# Patient Record
Sex: MALE | Race: OTHER | HISPANIC OR LATINO | URBAN - METROPOLITAN AREA
[De-identification: names, ages, dates, MRNs, and addresses within clinical notes are randomized per-mention and may not be internally consistent; named-entity substitution may affect disease eponyms.]

---

## 2024-09-24 ENCOUNTER — INPATIENT (INPATIENT)
Facility: HOSPITAL | Age: 49
LOS: 6 days | Discharge: HOME | DRG: 379 | End: 2024-10-01
Attending: INTERNAL MEDICINE | Admitting: INTERNAL MEDICINE
Payer: SELF-PAY

## 2024-09-24 VITALS
SYSTOLIC BLOOD PRESSURE: 85 MMHG | HEART RATE: 83 BPM | DIASTOLIC BLOOD PRESSURE: 53 MMHG | OXYGEN SATURATION: 99 % | RESPIRATION RATE: 20 BRPM | TEMPERATURE: 98 F

## 2024-09-24 LAB
ABO RH CONFIRMATION: SIGNIFICANT CHANGE UP
ALBUMIN SERPL ELPH-MCNC: 3.1 G/DL — LOW (ref 3.5–5)
ALP SERPL-CCNC: 112 U/L — SIGNIFICANT CHANGE UP (ref 40–120)
ALT FLD-CCNC: 44 U/L DA — SIGNIFICANT CHANGE UP (ref 10–60)
ANION GAP SERPL CALC-SCNC: 10 MMOL/L — SIGNIFICANT CHANGE UP (ref 5–17)
ANISOCYTOSIS BLD QL: SLIGHT — SIGNIFICANT CHANGE UP
APTT BLD: 25.8 SEC — SIGNIFICANT CHANGE UP (ref 24.5–35.6)
AST SERPL-CCNC: 25 U/L — SIGNIFICANT CHANGE UP (ref 10–40)
BASO STIPL BLD QL SMEAR: PRESENT — SIGNIFICANT CHANGE UP
BASOPHILS # BLD AUTO: 0.08 K/UL — SIGNIFICANT CHANGE UP (ref 0–0.2)
BASOPHILS NFR BLD AUTO: 0.7 % — SIGNIFICANT CHANGE UP (ref 0–2)
BILIRUB SERPL-MCNC: 0.4 MG/DL — SIGNIFICANT CHANGE UP (ref 0.2–1.2)
BLD GP AB SCN SERPL QL: SIGNIFICANT CHANGE UP
BUN SERPL-MCNC: 21 MG/DL — HIGH (ref 7–18)
CALCIUM SERPL-MCNC: 7.9 MG/DL — LOW (ref 8.4–10.5)
CHLORIDE SERPL-SCNC: 115 MMOL/L — HIGH (ref 96–108)
CO2 SERPL-SCNC: 21 MMOL/L — LOW (ref 22–31)
CREAT SERPL-MCNC: 1.22 MG/DL — SIGNIFICANT CHANGE UP (ref 0.5–1.3)
EGFR: 74 ML/MIN/1.73M2 — SIGNIFICANT CHANGE UP
EOSINOPHIL # BLD AUTO: 0.56 K/UL — HIGH (ref 0–0.5)
EOSINOPHIL NFR BLD AUTO: 4.8 % — SIGNIFICANT CHANGE UP (ref 0–6)
GLUCOSE SERPL-MCNC: 228 MG/DL — HIGH (ref 70–99)
HCT VFR BLD CALC: 21.2 % — LOW (ref 39–50)
HGB BLD-MCNC: 6.5 G/DL — CRITICAL LOW (ref 13–17)
HYPOCHROMIA BLD QL: SLIGHT — SIGNIFICANT CHANGE UP
IMM GRANULOCYTES NFR BLD AUTO: 0.4 % — SIGNIFICANT CHANGE UP (ref 0–0.9)
INR BLD: 1.3 RATIO — HIGH (ref 0.85–1.16)
LACTATE SERPL-SCNC: 2.9 MMOL/L — HIGH (ref 0.7–2)
LIDOCAIN IGE QN: 45 U/L — SIGNIFICANT CHANGE UP (ref 13–75)
LYMPHOCYTES # BLD AUTO: 3.56 K/UL — HIGH (ref 1–3.3)
LYMPHOCYTES # BLD AUTO: 30.6 % — SIGNIFICANT CHANGE UP (ref 13–44)
MAGNESIUM SERPL-MCNC: 1.7 MG/DL — SIGNIFICANT CHANGE UP (ref 1.6–2.6)
MANUAL SMEAR VERIFICATION: SIGNIFICANT CHANGE UP
MCHC RBC-ENTMCNC: 23.6 PG — LOW (ref 27–34)
MCHC RBC-ENTMCNC: 30.7 GM/DL — LOW (ref 32–36)
MCV RBC AUTO: 77.1 FL — LOW (ref 80–100)
MICROCYTES BLD QL: SLIGHT — SIGNIFICANT CHANGE UP
MONOCYTES # BLD AUTO: 0.83 K/UL — SIGNIFICANT CHANGE UP (ref 0–0.9)
MONOCYTES NFR BLD AUTO: 7.1 % — SIGNIFICANT CHANGE UP (ref 2–14)
NEUTROPHILS # BLD AUTO: 6.54 K/UL — SIGNIFICANT CHANGE UP (ref 1.8–7.4)
NEUTROPHILS NFR BLD AUTO: 56.4 % — SIGNIFICANT CHANGE UP (ref 43–77)
NRBC # BLD: 0 /100 WBCS — SIGNIFICANT CHANGE UP (ref 0–0)
OVALOCYTES BLD QL SMEAR: SLIGHT — SIGNIFICANT CHANGE UP
PLAT MORPH BLD: NORMAL — SIGNIFICANT CHANGE UP
PLATELET # BLD AUTO: 132 K/UL — LOW (ref 150–400)
PLATELET COUNT - ESTIMATE: ABNORMAL
POIKILOCYTOSIS BLD QL AUTO: SLIGHT — SIGNIFICANT CHANGE UP
POTASSIUM SERPL-MCNC: 3.4 MMOL/L — LOW (ref 3.5–5.3)
POTASSIUM SERPL-SCNC: 3.4 MMOL/L — LOW (ref 3.5–5.3)
PROT SERPL-MCNC: 5.9 G/DL — LOW (ref 6–8.3)
PROTHROM AB SERPL-ACNC: 15.2 SEC — HIGH (ref 9.9–13.4)
RBC # BLD: 2.75 M/UL — LOW (ref 4.2–5.8)
RBC # FLD: 17.1 % — HIGH (ref 10.3–14.5)
RBC BLD AUTO: ABNORMAL
SODIUM SERPL-SCNC: 146 MMOL/L — HIGH (ref 135–145)
TROPONIN I, HIGH SENSITIVITY RESULT: 16.5 NG/L — SIGNIFICANT CHANGE UP
WBC # BLD: 11.62 K/UL — HIGH (ref 3.8–10.5)
WBC # FLD AUTO: 11.62 K/UL — HIGH (ref 3.8–10.5)

## 2024-09-24 PROCEDURE — 99285 EMERGENCY DEPT VISIT HI MDM: CPT

## 2024-09-24 PROCEDURE — 99053 MED SERV 10PM-8AM 24 HR FAC: CPT

## 2024-09-24 PROCEDURE — 71275 CT ANGIOGRAPHY CHEST: CPT | Mod: 26,MC

## 2024-09-24 PROCEDURE — 74174 CTA ABD&PLVS W/CONTRAST: CPT | Mod: 26,MC

## 2024-09-24 RX ORDER — SODIUM CHLORIDE 0.9 % (FLUSH) 0.9 %
1000 SYRINGE (ML) INJECTION ONCE
Refills: 0 | Status: COMPLETED | OUTPATIENT
Start: 2024-09-24 | End: 2024-09-24

## 2024-09-24 RX ORDER — ONDANSETRON HCL/PF 4 MG/2 ML
4 VIAL (ML) INJECTION ONCE
Refills: 0 | Status: COMPLETED | OUTPATIENT
Start: 2024-09-24 | End: 2024-09-24

## 2024-09-24 RX ORDER — PANTOPRAZOLE SODIUM 40 MG/1
80 TABLET, DELAYED RELEASE ORAL ONCE
Refills: 0 | Status: COMPLETED | OUTPATIENT
Start: 2024-09-24 | End: 2024-09-24

## 2024-09-24 RX ADMIN — Medication 4 MILLIGRAM(S): at 22:10

## 2024-09-24 RX ADMIN — Medication 1000 MILLILITER(S): at 22:05

## 2024-09-24 RX ADMIN — PANTOPRAZOLE SODIUM 80 MILLIGRAM(S): 40 TABLET, DELAYED RELEASE ORAL at 22:23

## 2024-09-24 RX ADMIN — Medication 1000 MILLILITER(S): at 22:20

## 2024-09-24 NOTE — ED PROVIDER NOTE - PROGRESS NOTE DETAILS
patient updated on results. reports symptoms improving. BP improving, but still mildly hypotensive. received approximately 1L NS and 2 units prbc. will order 3rd  unit. Rodríguez Molina spoke with hospitalist team. will admit to tele for close monitoring. Rodríguez Molina

## 2024-09-24 NOTE — ED PROVIDER NOTE - OBJECTIVE STATEMENT
48-year-old male presenting with vomiting blood.  Patient reports he has been having some knee pains earlier today he took an ibuprofen and later also took another pain medicine.  Reports around 9 PM he started vomiting blood.  Denies any chest pain or coughing.  States he has never vomited blood before.  Denies drinking alcohol every day and states he has been sober for the past 7 years.  Has never needed a blood transfusion.  No recent travel.

## 2024-09-24 NOTE — ED PROVIDER NOTE - CLINICAL SUMMARY MEDICAL DECISION MAKING FREE TEXT BOX
48-year-old male presenting with vomiting blood.  Patient called purple to waiting room for active hematemesis.  Patient visualized with large amount of blood in plastic bag.  Patient denies any anticoagulation, previous history of anemia or any previous history of hematemesis.  Concern for GI bleed.  Will obtain labs and patient to be expedited to CAT scan. 48-year-old male presenting with vomiting blood.  Patient called purple to waiting room for active hematemesis.  Patient visualized with large amount of blood in plastic bag.  Patient denies any anticoagulation, previous history of anemia or any previous history of hematemesis.  Concern for GI bleed.  Will obtain labs and patient to be expedited to CAT scan.    hgb 6.5. called for 2 units uncrossed matched blood. PCA sent to . 48-year-old male presenting with vomiting blood.  Patient called purple to waiting room for active hematemesis.  Patient visualized with large amount of blood in plastic bag.  Patient denies any anticoagulation, previous history of anemia or any previous history of hematemesis.  Concern for GI bleed.  Will obtain labs and patient to be expedited to CAT scan.     hgb 6.5. called for 2 units uncrossed matched blood. PCA sent to .    2231: patient verbally consented for blood transfusion using  service. blood at bedside.

## 2024-09-24 NOTE — ED ADULT NURSE NOTE - OBJECTIVE STATEMENT
Pt present to the Ed with c/o epigastric pain with ashanti blood vomitus Pt present to the Ed with c/o epigastric pain with ashanti blood vomitus. Pt  is hypotensive , placed the pt on monitor.

## 2024-09-24 NOTE — ED ADULT NURSE NOTE - CHIEF COMPLAINT QUOTE
pt c/o epigastric pain with ashanti blood vomitus in WR, code purple called in WR, pt evaluated by Jesse CHOWDHURY

## 2024-09-24 NOTE — ED ADULT NURSE REASSESSMENT NOTE - NS ED NURSE REASSESS COMMENT FT1
Pt received 2 units of un crossmatched O -ve blood  fro hypotension through rapid infuser as per MD Molina order . Ist transfusion started@ 2240 and ended @ 2245 , 2nd transfusion started at 2247 and ended @2250 without any transfusion reaction .

## 2024-09-24 NOTE — ED ADULT TRIAGE NOTE - CHIEF COMPLAINT QUOTE
pt c/o epigastric pain with ashanti blood vomitus pt c/o epigastric pain with ashanti blood vomitus in WR, code purple called in WR, pt evaluated by Jesse CHOWDHURY

## 2024-09-24 NOTE — ED PROVIDER NOTE - PHYSICAL EXAMINATION
General: moderate acute distress   HEENT: normocephalic, atraumatic   Respiratory: normal work of breathing  Cardiac: regular rate and rhythm   Skin: warm, dry   Neuro: A&Ox3  Psych: appropriate affect General: moderate acute distress   HEENT: normocephalic, atraumatic   Respiratory: normal work of breathing  Cardiac: regular rate and rhythm   Skin: diaphoretic    Neuro: A&Ox3  Psych: appropriate affect

## 2024-09-25 DIAGNOSIS — Z29.9 ENCOUNTER FOR PROPHYLACTIC MEASURES, UNSPECIFIED: ICD-10-CM

## 2024-09-25 DIAGNOSIS — M25.561 PAIN IN RIGHT KNEE: ICD-10-CM

## 2024-09-25 DIAGNOSIS — K92.0 HEMATEMESIS: ICD-10-CM

## 2024-09-25 DIAGNOSIS — K74.60 UNSPECIFIED CIRRHOSIS OF LIVER: ICD-10-CM

## 2024-09-25 DIAGNOSIS — K92.2 GASTROINTESTINAL HEMORRHAGE, UNSPECIFIED: ICD-10-CM

## 2024-09-25 LAB
ALBUMIN SERPL ELPH-MCNC: 2.7 G/DL — LOW (ref 3.5–5)
ALBUMIN SERPL ELPH-MCNC: 2.9 G/DL — LOW (ref 3.5–5)
ALBUMIN SERPL ELPH-MCNC: 3.1 G/DL — LOW (ref 3.5–5)
ALP SERPL-CCNC: 77 U/L — SIGNIFICANT CHANGE UP (ref 40–120)
ALP SERPL-CCNC: 81 U/L — SIGNIFICANT CHANGE UP (ref 40–120)
ALP SERPL-CCNC: 89 U/L — SIGNIFICANT CHANGE UP (ref 40–120)
ALT FLD-CCNC: 37 U/L DA — SIGNIFICANT CHANGE UP (ref 10–60)
ALT FLD-CCNC: 38 U/L DA — SIGNIFICANT CHANGE UP (ref 10–60)
ALT FLD-CCNC: 39 U/L DA — SIGNIFICANT CHANGE UP (ref 10–60)
ANION GAP SERPL CALC-SCNC: 6 MMOL/L — SIGNIFICANT CHANGE UP (ref 5–17)
ANION GAP SERPL CALC-SCNC: 7 MMOL/L — SIGNIFICANT CHANGE UP (ref 5–17)
ANION GAP SERPL CALC-SCNC: 8 MMOL/L — SIGNIFICANT CHANGE UP (ref 5–17)
ANISOCYTOSIS BLD QL: SLIGHT — SIGNIFICANT CHANGE UP
APTT BLD: 26.6 SEC — SIGNIFICANT CHANGE UP (ref 24.5–35.6)
AST SERPL-CCNC: 19 U/L — SIGNIFICANT CHANGE UP (ref 10–40)
AST SERPL-CCNC: 20 U/L — SIGNIFICANT CHANGE UP (ref 10–40)
AST SERPL-CCNC: 22 U/L — SIGNIFICANT CHANGE UP (ref 10–40)
BASOPHILS # BLD AUTO: 0.01 K/UL — SIGNIFICANT CHANGE UP (ref 0–0.2)
BASOPHILS NFR BLD AUTO: 0.2 % — SIGNIFICANT CHANGE UP (ref 0–2)
BILIRUB SERPL-MCNC: 0.5 MG/DL — SIGNIFICANT CHANGE UP (ref 0.2–1.2)
BILIRUB SERPL-MCNC: 0.6 MG/DL — SIGNIFICANT CHANGE UP (ref 0.2–1.2)
BILIRUB SERPL-MCNC: 0.6 MG/DL — SIGNIFICANT CHANGE UP (ref 0.2–1.2)
BUN SERPL-MCNC: 21 MG/DL — HIGH (ref 7–18)
BUN SERPL-MCNC: 23 MG/DL — HIGH (ref 7–18)
BUN SERPL-MCNC: 23 MG/DL — HIGH (ref 7–18)
CALCIUM SERPL-MCNC: 6.9 MG/DL — LOW (ref 8.4–10.5)
CALCIUM SERPL-MCNC: 7.8 MG/DL — LOW (ref 8.4–10.5)
CALCIUM SERPL-MCNC: 7.8 MG/DL — LOW (ref 8.4–10.5)
CHLORIDE SERPL-SCNC: 116 MMOL/L — HIGH (ref 96–108)
CHLORIDE SERPL-SCNC: 117 MMOL/L — HIGH (ref 96–108)
CHLORIDE SERPL-SCNC: 118 MMOL/L — HIGH (ref 96–108)
CO2 SERPL-SCNC: 22 MMOL/L — SIGNIFICANT CHANGE UP (ref 22–31)
CO2 SERPL-SCNC: 22 MMOL/L — SIGNIFICANT CHANGE UP (ref 22–31)
CO2 SERPL-SCNC: 23 MMOL/L — SIGNIFICANT CHANGE UP (ref 22–31)
CREAT SERPL-MCNC: 0.79 MG/DL — SIGNIFICANT CHANGE UP (ref 0.5–1.3)
CREAT SERPL-MCNC: 0.8 MG/DL — SIGNIFICANT CHANGE UP (ref 0.5–1.3)
CREAT SERPL-MCNC: 0.92 MG/DL — SIGNIFICANT CHANGE UP (ref 0.5–1.3)
EGFR: 103 ML/MIN/1.73M2 — SIGNIFICANT CHANGE UP
EGFR: 110 ML/MIN/1.73M2 — SIGNIFICANT CHANGE UP
EGFR: 110 ML/MIN/1.73M2 — SIGNIFICANT CHANGE UP
EOSINOPHIL # BLD AUTO: 0.05 K/UL — SIGNIFICANT CHANGE UP (ref 0–0.5)
EOSINOPHIL NFR BLD AUTO: 1.1 % — SIGNIFICANT CHANGE UP (ref 0–6)
GAS PNL BLDA: SIGNIFICANT CHANGE UP
GLUCOSE SERPL-MCNC: 121 MG/DL — HIGH (ref 70–99)
GLUCOSE SERPL-MCNC: 133 MG/DL — HIGH (ref 70–99)
GLUCOSE SERPL-MCNC: 144 MG/DL — HIGH (ref 70–99)
HCT VFR BLD CALC: 19.7 % — CRITICAL LOW (ref 39–50)
HCT VFR BLD CALC: 21.3 % — LOW (ref 39–50)
HCT VFR BLD CALC: 22 % — LOW (ref 39–50)
HCT VFR BLD CALC: 22.2 % — LOW (ref 39–50)
HGB BLD-MCNC: 6.7 G/DL — CRITICAL LOW (ref 13–17)
HGB BLD-MCNC: 6.7 G/DL — CRITICAL LOW (ref 13–17)
HGB BLD-MCNC: 7.2 G/DL — LOW (ref 13–17)
HGB BLD-MCNC: 7.4 G/DL — LOW (ref 13–17)
HYPOCHROMIA BLD QL: SLIGHT — SIGNIFICANT CHANGE UP
IMM GRANULOCYTES NFR BLD AUTO: 0.4 % — SIGNIFICANT CHANGE UP (ref 0–0.9)
INR BLD: 1.2 RATIO — HIGH (ref 0.85–1.16)
LACTATE SERPL-SCNC: 1.5 MMOL/L — SIGNIFICANT CHANGE UP (ref 0.7–2)
LYMPHOCYTES # BLD AUTO: 0.37 K/UL — LOW (ref 1–3.3)
LYMPHOCYTES # BLD AUTO: 8 % — LOW (ref 13–44)
MAGNESIUM SERPL-MCNC: 1.8 MG/DL — SIGNIFICANT CHANGE UP (ref 1.6–2.6)
MANUAL SMEAR VERIFICATION: SIGNIFICANT CHANGE UP
MCHC RBC-ENTMCNC: 25.7 PG — LOW (ref 27–34)
MCHC RBC-ENTMCNC: 25.8 PG — LOW (ref 27–34)
MCHC RBC-ENTMCNC: 26.2 PG — LOW (ref 27–34)
MCHC RBC-ENTMCNC: 26.7 PG — LOW (ref 27–34)
MCHC RBC-ENTMCNC: 31.5 GM/DL — LOW (ref 32–36)
MCHC RBC-ENTMCNC: 32.7 GM/DL — SIGNIFICANT CHANGE UP (ref 32–36)
MCHC RBC-ENTMCNC: 33.3 GM/DL — SIGNIFICANT CHANGE UP (ref 32–36)
MCHC RBC-ENTMCNC: 34 GM/DL — SIGNIFICANT CHANGE UP (ref 32–36)
MCV RBC AUTO: 78.5 FL — LOW (ref 80–100)
MCV RBC AUTO: 78.7 FL — LOW (ref 80–100)
MCV RBC AUTO: 78.9 FL — LOW (ref 80–100)
MCV RBC AUTO: 81.6 FL — SIGNIFICANT CHANGE UP (ref 80–100)
MICROCYTES BLD QL: SLIGHT — SIGNIFICANT CHANGE UP
MONOCYTES # BLD AUTO: 0.31 K/UL — SIGNIFICANT CHANGE UP (ref 0–0.9)
MONOCYTES NFR BLD AUTO: 6.7 % — SIGNIFICANT CHANGE UP (ref 2–14)
NEUTROPHILS # BLD AUTO: 3.87 K/UL — SIGNIFICANT CHANGE UP (ref 1.8–7.4)
NEUTROPHILS NFR BLD AUTO: 83.6 % — HIGH (ref 43–77)
NRBC # BLD: 0 /100 WBCS — SIGNIFICANT CHANGE UP (ref 0–0)
OVALOCYTES BLD QL SMEAR: SLIGHT — SIGNIFICANT CHANGE UP
PHOSPHATE SERPL-MCNC: 3.4 MG/DL — SIGNIFICANT CHANGE UP (ref 2.5–4.5)
PLAT MORPH BLD: NORMAL — SIGNIFICANT CHANGE UP
PLATELET # BLD AUTO: 46 K/UL — LOW (ref 150–400)
PLATELET # BLD AUTO: 49 K/UL — LOW (ref 150–400)
PLATELET # BLD AUTO: 54 K/UL — LOW (ref 150–400)
PLATELET # BLD AUTO: 56 K/UL — LOW (ref 150–400)
PLATELET COUNT - ESTIMATE: ABNORMAL
POTASSIUM SERPL-MCNC: 3.8 MMOL/L — SIGNIFICANT CHANGE UP (ref 3.5–5.3)
POTASSIUM SERPL-MCNC: 3.9 MMOL/L — SIGNIFICANT CHANGE UP (ref 3.5–5.3)
POTASSIUM SERPL-MCNC: 4.7 MMOL/L — SIGNIFICANT CHANGE UP (ref 3.5–5.3)
POTASSIUM SERPL-SCNC: 3.8 MMOL/L — SIGNIFICANT CHANGE UP (ref 3.5–5.3)
POTASSIUM SERPL-SCNC: 3.9 MMOL/L — SIGNIFICANT CHANGE UP (ref 3.5–5.3)
POTASSIUM SERPL-SCNC: 4.7 MMOL/L — SIGNIFICANT CHANGE UP (ref 3.5–5.3)
PROT SERPL-MCNC: 5 G/DL — LOW (ref 6–8.3)
PROT SERPL-MCNC: 5.4 G/DL — LOW (ref 6–8.3)
PROT SERPL-MCNC: 5.6 G/DL — LOW (ref 6–8.3)
PROTHROM AB SERPL-ACNC: 13.9 SEC — HIGH (ref 9.9–13.4)
RBC # BLD: 2.51 M/UL — LOW (ref 4.2–5.8)
RBC # BLD: 2.61 M/UL — LOW (ref 4.2–5.8)
RBC # BLD: 2.79 M/UL — LOW (ref 4.2–5.8)
RBC # BLD: 2.82 M/UL — LOW (ref 4.2–5.8)
RBC # FLD: 15.8 % — HIGH (ref 10.3–14.5)
RBC # FLD: 15.8 % — HIGH (ref 10.3–14.5)
RBC # FLD: 16 % — HIGH (ref 10.3–14.5)
RBC # FLD: 17.4 % — HIGH (ref 10.3–14.5)
RBC BLD AUTO: ABNORMAL
SODIUM SERPL-SCNC: 145 MMOL/L — SIGNIFICANT CHANGE UP (ref 135–145)
SODIUM SERPL-SCNC: 147 MMOL/L — HIGH (ref 135–145)
SODIUM SERPL-SCNC: 147 MMOL/L — HIGH (ref 135–145)
STOMATOCYTES BLD QL SMEAR: SIGNIFICANT CHANGE UP
WBC # BLD: 2.68 K/UL — LOW (ref 3.8–10.5)
WBC # BLD: 3.25 K/UL — LOW (ref 3.8–10.5)
WBC # BLD: 4.1 K/UL — SIGNIFICANT CHANGE UP (ref 3.8–10.5)
WBC # BLD: 4.63 K/UL — SIGNIFICANT CHANGE UP (ref 3.8–10.5)
WBC # FLD AUTO: 2.68 K/UL — LOW (ref 3.8–10.5)
WBC # FLD AUTO: 3.25 K/UL — LOW (ref 3.8–10.5)
WBC # FLD AUTO: 4.1 K/UL — SIGNIFICANT CHANGE UP (ref 3.8–10.5)
WBC # FLD AUTO: 4.63 K/UL — SIGNIFICANT CHANGE UP (ref 3.8–10.5)

## 2024-09-25 PROCEDURE — 99223 1ST HOSP IP/OBS HIGH 75: CPT | Mod: 25

## 2024-09-25 PROCEDURE — 99223 1ST HOSP IP/OBS HIGH 75: CPT | Mod: GC

## 2024-09-25 PROCEDURE — 43255 EGD CONTROL BLEEDING ANY: CPT | Mod: 59

## 2024-09-25 RX ORDER — INFLUENZA VIRUS VACCINE 15; 15; 15; 15 UG/.5ML; UG/.5ML; UG/.5ML; UG/.5ML
0.5 SUSPENSION INTRAMUSCULAR ONCE
Refills: 0 | Status: DISCONTINUED | OUTPATIENT
Start: 2024-09-25 | End: 2024-10-01

## 2024-09-25 RX ORDER — FENTANYL CITRATE-0.9 % NACL/PF 300MCG/30
1.5 PATIENT CONTROLLED ANALGESIA VIAL INJECTION
Qty: 2500 | Refills: 0 | Status: DISCONTINUED | OUTPATIENT
Start: 2024-09-25 | End: 2024-09-26

## 2024-09-25 RX ORDER — FENTANYL CITRATE-0.9 % NACL/PF 300MCG/30
50 PATIENT CONTROLLED ANALGESIA VIAL INJECTION ONCE
Refills: 0 | Status: DISCONTINUED | OUTPATIENT
Start: 2024-09-25 | End: 2024-09-25

## 2024-09-25 RX ORDER — SODIUM CHLORIDE IRRIG SOLUTION 0.9 %
1000 SOLUTION, IRRIGATION IRRIGATION
Refills: 0 | Status: DISCONTINUED | OUTPATIENT
Start: 2024-09-25 | End: 2024-09-25

## 2024-09-25 RX ORDER — ACETAMINOPHEN 325 MG
650 TABLET ORAL EVERY 6 HOURS
Refills: 0 | Status: DISCONTINUED | OUTPATIENT
Start: 2024-09-25 | End: 2024-09-25

## 2024-09-25 RX ORDER — FOLIC ACID 1 MG/1
1 TABLET ORAL DAILY
Refills: 0 | Status: DISCONTINUED | OUTPATIENT
Start: 2024-09-25 | End: 2024-09-27

## 2024-09-25 RX ORDER — CHLORHEXIDINE GLUCONATE ORAL RINSE 1.2 MG/ML
1 SOLUTION DENTAL DAILY
Refills: 0 | Status: DISCONTINUED | OUTPATIENT
Start: 2024-09-25 | End: 2024-09-25

## 2024-09-25 RX ORDER — PROPOFOL 10 MG/ML
30 INJECTION, EMULSION INTRAVENOUS
Qty: 1000 | Refills: 0 | Status: DISCONTINUED | OUTPATIENT
Start: 2024-09-25 | End: 2024-09-26

## 2024-09-25 RX ORDER — MIDAZOLAM HCL 1 MG/ML
2 VIAL (ML) INJECTION ONCE
Refills: 0 | Status: DISCONTINUED | OUTPATIENT
Start: 2024-09-25 | End: 2024-09-25

## 2024-09-25 RX ORDER — PIPERACILLIN SODIUM AND TAZOBACTAM SODIUM 12; 1.5 G/60ML; G/60ML
3.38 INJECTION, POWDER, LYOPHILIZED, FOR SOLUTION INTRAVENOUS ONCE
Refills: 0 | Status: COMPLETED | OUTPATIENT
Start: 2024-09-25 | End: 2024-09-25

## 2024-09-25 RX ORDER — ONDANSETRON HCL/PF 4 MG/2 ML
4 VIAL (ML) INJECTION EVERY 8 HOURS
Refills: 0 | Status: DISCONTINUED | OUTPATIENT
Start: 2024-09-25 | End: 2024-10-01

## 2024-09-25 RX ORDER — OCTREOTIDE ACETATE 50 UG/ML
50 INJECTION, SOLUTION INTRAVENOUS; SUBCUTANEOUS ONCE
Refills: 0 | Status: COMPLETED | OUTPATIENT
Start: 2024-09-25 | End: 2024-09-25

## 2024-09-25 RX ORDER — CHLORHEXIDINE GLUCONATE ORAL RINSE 1.2 MG/ML
1 SOLUTION DENTAL
Refills: 0 | Status: DISCONTINUED | OUTPATIENT
Start: 2024-09-25 | End: 2024-10-01

## 2024-09-25 RX ORDER — THIAMINE HYDROCHLORIDE 100 MG/ML
500 INJECTION, SOLUTION INTRAMUSCULAR; INTRAVENOUS EVERY 8 HOURS
Refills: 0 | Status: COMPLETED | OUTPATIENT
Start: 2024-09-25 | End: 2024-09-28

## 2024-09-25 RX ORDER — PANTOPRAZOLE SODIUM 40 MG/1
40 TABLET, DELAYED RELEASE ORAL
Refills: 0 | Status: DISCONTINUED | OUTPATIENT
Start: 2024-09-25 | End: 2024-10-01

## 2024-09-25 RX ORDER — ONDANSETRON HCL/PF 4 MG/2 ML
4 VIAL (ML) INJECTION EVERY 8 HOURS
Refills: 0 | Status: DISCONTINUED | OUTPATIENT
Start: 2024-09-25 | End: 2024-09-25

## 2024-09-25 RX ORDER — FENTANYL CITRATE-0.9 % NACL/PF 300MCG/30
25 PATIENT CONTROLLED ANALGESIA VIAL INJECTION EVERY 4 HOURS
Refills: 0 | Status: DISCONTINUED | OUTPATIENT
Start: 2024-09-25 | End: 2024-09-26

## 2024-09-25 RX ORDER — CHLORHEXIDINE GLUCONATE ORAL RINSE 1.2 MG/ML
15 SOLUTION DENTAL EVERY 12 HOURS
Refills: 0 | Status: DISCONTINUED | OUTPATIENT
Start: 2024-09-25 | End: 2024-09-26

## 2024-09-25 RX ORDER — PIPERACILLIN SODIUM AND TAZOBACTAM SODIUM 12; 1.5 G/60ML; G/60ML
3.38 INJECTION, POWDER, LYOPHILIZED, FOR SOLUTION INTRAVENOUS EVERY 8 HOURS
Refills: 0 | Status: DISCONTINUED | OUTPATIENT
Start: 2024-09-26 | End: 2024-09-30

## 2024-09-25 RX ORDER — FENTANYL CITRATE-0.9 % NACL/PF 300MCG/30
0.5 PATIENT CONTROLLED ANALGESIA VIAL INJECTION
Qty: 2500 | Refills: 0 | Status: DISCONTINUED | OUTPATIENT
Start: 2024-09-25 | End: 2024-09-25

## 2024-09-25 RX ORDER — OCTREOTIDE ACETATE 50 UG/ML
50 INJECTION, SOLUTION INTRAVENOUS; SUBCUTANEOUS
Qty: 500 | Refills: 0 | Status: DISCONTINUED | OUTPATIENT
Start: 2024-09-25 | End: 2024-09-28

## 2024-09-25 RX ORDER — SODIUM CHLORIDE 0.9 % (FLUSH) 0.9 %
10 SYRINGE (ML) INJECTION
Refills: 0 | Status: DISCONTINUED | OUTPATIENT
Start: 2024-09-25 | End: 2024-09-26

## 2024-09-25 RX ORDER — CHLORHEXIDINE GLUCONATE ORAL RINSE 1.2 MG/ML
1 SOLUTION DENTAL
Refills: 0 | Status: DISCONTINUED | OUTPATIENT
Start: 2024-09-25 | End: 2024-09-25

## 2024-09-25 RX ADMIN — PROPOFOL 13.8 MICROGRAM(S)/KG/MIN: 10 INJECTION, EMULSION INTRAVENOUS at 18:24

## 2024-09-25 RX ADMIN — OCTREOTIDE ACETATE 50 MICROGRAM(S): 50 INJECTION, SOLUTION INTRAVENOUS; SUBCUTANEOUS at 02:09

## 2024-09-25 RX ADMIN — FOLIC ACID 1 MILLIGRAM(S): 1 TABLET ORAL at 18:17

## 2024-09-25 RX ADMIN — Medication 90 MILLILITER(S): at 08:33

## 2024-09-25 RX ADMIN — Medication 50 MICROGRAM(S): at 15:53

## 2024-09-25 RX ADMIN — PIPERACILLIN SODIUM AND TAZOBACTAM SODIUM 25 GRAM(S): 12; 1.5 INJECTION, POWDER, LYOPHILIZED, FOR SOLUTION INTRAVENOUS at 22:05

## 2024-09-25 RX ADMIN — CHLORHEXIDINE GLUCONATE ORAL RINSE 15 MILLILITER(S): 1.2 SOLUTION DENTAL at 17:02

## 2024-09-25 RX ADMIN — PIPERACILLIN SODIUM AND TAZOBACTAM SODIUM 200 GRAM(S): 12; 1.5 INJECTION, POWDER, LYOPHILIZED, FOR SOLUTION INTRAVENOUS at 17:03

## 2024-09-25 RX ADMIN — THIAMINE HYDROCHLORIDE 105 MILLIGRAM(S): 100 INJECTION, SOLUTION INTRAMUSCULAR; INTRAVENOUS at 22:05

## 2024-09-25 RX ADMIN — OCTREOTIDE ACETATE 10 MICROGRAM(S)/HR: 50 INJECTION, SOLUTION INTRAVENOUS; SUBCUTANEOUS at 11:50

## 2024-09-25 RX ADMIN — OCTREOTIDE ACETATE 10 MICROGRAM(S)/HR: 50 INJECTION, SOLUTION INTRAVENOUS; SUBCUTANEOUS at 02:08

## 2024-09-25 RX ADMIN — Medication 16.2 MICROGRAM(S)/KG/HR: at 20:50

## 2024-09-25 RX ADMIN — CHLORHEXIDINE GLUCONATE ORAL RINSE 1 APPLICATION(S): 1.2 SOLUTION DENTAL at 18:18

## 2024-09-25 RX ADMIN — Medication 2 MILLIGRAM(S): at 15:53

## 2024-09-25 RX ADMIN — PANTOPRAZOLE SODIUM 40 MILLIGRAM(S): 40 TABLET, DELAYED RELEASE ORAL at 07:31

## 2024-09-25 RX ADMIN — Medication 50 MICROGRAM(S): at 15:45

## 2024-09-25 RX ADMIN — CHLORHEXIDINE GLUCONATE ORAL RINSE 1 APPLICATION(S): 1.2 SOLUTION DENTAL at 15:54

## 2024-09-25 RX ADMIN — OCTREOTIDE ACETATE 10 MICROGRAM(S)/HR: 50 INJECTION, SOLUTION INTRAVENOUS; SUBCUTANEOUS at 08:34

## 2024-09-25 RX ADMIN — OCTREOTIDE ACETATE 10 MICROGRAM(S)/HR: 50 INJECTION, SOLUTION INTRAVENOUS; SUBCUTANEOUS at 18:21

## 2024-09-25 RX ADMIN — PANTOPRAZOLE SODIUM 40 MILLIGRAM(S): 40 TABLET, DELAYED RELEASE ORAL at 17:02

## 2024-09-25 RX ADMIN — Medication 3.84 MICROGRAM(S)/KG/HR: at 20:21

## 2024-09-25 NOTE — CONSULT NOTE ADULT - ASSESSMENT
47 yo M PMH cirrhosis presented to ED with several bouts of hematemesis and Hgb 6.5. CTAP showed inferior esophageal wall thickening. Hgb improved to 7.2 s/p 2uprbc. GI consulted for management of AUGIB.    #Hematemesis  #Hx of cirrhosis  #Thrombocytopenia  #Splenomegaly  6 episodes of hematemesis (no prior history)  p/w Hgb 6.5   s/p 2LNS  unclear whether patient received 1 or 2 units prbc in ED    Patient currently hemodynamically stable, no leukocytosis, LFTs wnl  Hgb 7.2<6.7<6.5 s/p 1uprbc (per Worklist documentation)   Platelets downtrending 49<46<126K s/p 1 unit platelets  CTAP significant for inferior esophageal wall thickening  -maintain NPO  -c/w PPI BID, Zofran PRN  -c/w maintenance IVF  c/w octreotide drip  -maintain active T&S, 2 large bore peripheral IVs, transfuse for goal Hb >7 or if symptomatic  -cbc Q4hrs for Hgb/platelet monitor   -For EGD today r/o variceal bleeding/ulcerations/Unique Berger tear 49 yo M PMH cirrhosis presented to ED with several bouts of hematemesis and Hgb 6.5. CTAP showed inferior esophageal wall thickening. Hgb improved to 7.2 s/p 1uprbc. GI consulted for management of AUGIB.    #Hematemesis  #Hx of cirrhosis  #Thrombocytopenia  #Splenomegaly  6 episodes of hematemesis (no prior history)  p/w Hgb 6.5   s/p 2LNS  unclear whether patient received 1 or 2 units prbc in ED    Patient currently hemodynamically stable, no leukocytosis, LFTs wnl  Hgb 7.2<6.7<6.5 s/p 1uprbc (per Worklist documentation)   Platelets downtrending 49<46<126K s/p 1 unit platelets  CTAP significant for inferior esophageal wall thickening  -maintain NPO  -c/w PPI BID, Zofran PRN  -c/w maintenance IVF  c/w octreotide drip  -maintain active T&S, 2 large bore peripheral IVs, transfuse for goal Hb >7 or if symptomatic  -cbc Q6hrs for Hgb/platelet monitor   -For EGD today r/o variceal bleeding/ulcerations/Unique Berger tear

## 2024-09-25 NOTE — RAPID RESPONSE TEAM SUMMARY - NSSITUATIONBACKGROUNDRRT_GEN_ALL_CORE
48-year-old male with pmhx of cirrhosis and right knee pain presenting with hematemesis to ED. Bp: 86/52 mmHg, Moe 6.5, plt 102, lactate 2.5. Received 1PRBC and 1plt. Patient admitted to medicine floors. Patient was in endoscopy suite getting EGD RRT called for perfuse hematemesis. Patient was intubated for airway protection. Blood pressure stable. EGD performed, banded. MTP called received 2PRBC and 1FFP Cordis placed in right femoral vein, arterial line placed in right radial artery. Patient transferred to ICU for variceal bleed.       48-year-old male with pmhx of cirrhosis and right knee pain presenting with 5 episodes of hematemesis to ED. BP: 86/52 mmHg, Hb 6.5, plt 102, lactate 2.5. Received 1PRBC and 1plt. Patient admitted to medicine floors. Patient was in endoscopy suite getting EGD RRT called for perfuse hematemesis. Patient was intubated for airway protection. Blood pressure stable. EGD performed, banded. MTP called received 2PRBC and 1FFP Cordis placed in right femoral vein, arterial line placed in right radial artery. Patient transferred to ICU for variceal bleed.

## 2024-09-25 NOTE — H&P ADULT - NSHPPHYSICALEXAM_GEN_ALL_CORE
T(C): 36.5 (09-25-24 @ 00:31), Max: 36.5 (09-25-24 @ 00:31)  HR: 72 (09-25-24 @ 00:31) (71 - 84)  BP: 99/62 (09-25-24 @ 00:31) (84/58 - 101/59)  RR: 16 (09-25-24 @ 00:31) (12 - 20)  SpO2: 100% (09-25-24 @ 00:31) (98% - 100%)    CONSTITUTIONAL: Well groomed, no apparent distress  EYES: PERRLA and symmetric, EOMI, No conjunctival or scleral injection, non-icteric  ENMT: Oral mucosa with moist membranes. Normal dentition; no pharyngeal injection or exudates             NECK: Supple, symmetric and without tracheal deviation   RESP: No respiratory distress, no use of accessory muscles; CTA b/l, no WRR  CV: RRR, +S1S2, no MRG; no JVD; no peripheral edema  GI: Soft, NT, ND, no rebound, no guarding; no palpable masses; no hepatosplenomegaly; no hernia palpated  LYMPH: No cervical LAD or tenderness; no axillary LAD or tenderness; no inguinal LAD or tenderness  MSK: mild pain upon movement of right knee.   SKIN: No rashes or ulcers noted; no subcutaneous nodules or induration palpable  NEURO: CN II-XII intact; normal reflexes in upper and lower extremities, sensation intact in upper and lower extremities b/l to light touch   PSYCH: Appropriate insight/judgment; A+O x 3, mood and affect appropriate, recent/remote memory intact

## 2024-09-25 NOTE — H&P ADULT - NSHPREVIEWOFSYSTEMS_GEN_ALL_CORE
REVIEW OF SYSTEMS:    CONSTITUTIONAL: No fever, chills, or weakness.   EYES/ENT: No visual changes;  No ear pain, runny nose, or sore throat.   NECK: No pain or stiffness.  RESPIRATORY: No cough, wheezing, hemoptysis; No shortness of breath.  CARDIOVASCULAR: No chest pain, dyspnea on exertion, or palpitations.  GASTROINTESTINAL+ hematemesis , + nausea    GENITOURINARY: No dysuria, frequency or hematuria.  NEUROLOGICAL: No numbness or weakness.  SKIN: No itching, rashes.

## 2024-09-25 NOTE — CONSULT NOTE ADULT - NS ATTEND AMEND GEN_ALL_CORE FT
48 M pior h/o EtOH abuse, son confirms no EtOH in past 2-3 y, here with hematemesis.   Plan  octerotide gtt  PPI gtt  EGD, same day   Hb >7 min; transfuse as needed

## 2024-09-25 NOTE — H&P ADULT - PROBLEM SELECTOR PLAN 1
p/w multiple of hematemesis   history of alcohol use, but not in the past 7 years   likely from underlying varices   Vitals: temp 97.6 , HR 82, BP 84/58-> 99/62  s/p 2 units pf PRBC , 80 mg of ppi, 2L of nacl, zofran   hemoglobin 6.5 , HCT 21.2  CT angio: Wall thickening along the inferior esophagus. Underlying esophageal tear cannot be excluded. Stomach moderately distended with combination of fluid and blood products. Marked splenomegaly.  hemoglobin after 2 units 6.7-> another unit-> f/u cbc post transfusion   cbc q4-6  admit to tele for closer monitoring   maintain active T&S, 2 large bore peripheral IVs, transfuse for goal Hb >7 or if symptomatic  NPO  PPi BID  c/w iv fluids  c/w zofran prn   -GI consult in AM p/w multiple of hematemesis   history of alcohol use, but not in the past 7 years   likely from underlying varices   Vitals: temp 97.6 , HR 82, BP 84/58-> 99/62  s/p 2 units pf PRBC , 80 mg of ppi, 2L of nacl, zofran   hemoglobin 6.5 , HCT 21.2  CT angio: Wall thickening along the inferior esophagus. Underlying esophageal tear cannot be excluded. Stomach moderately distended with combination of fluid and blood products. Marked splenomegaly.  hemoglobin after 2 units 6.7-> another unit-> f/u cbc post transfusion   low platelets-> s/p one unit of PLT   cbc q4-6  admit to tele for closer monitoring   maintain active T&S, 2 large bore peripheral IVs, transfuse for goal Hb >7 or if symptomatic  NPO  PPi BID  c/w iv fluids  c/w zofran prn   -GI consult in AM

## 2024-09-25 NOTE — H&P ADULT - ATTENDING COMMENTS
IMAGING  CTA C/A/P  IMPRESSION:  No acute aortic pathology.  Wall thickening along the inferior esophagus. Underlying esophageal tear cannot be excluded. If there is clinical concern fluoroscopy could better evaluate.  Stomach moderately distended with combination of fluid and blood products compatible with history of hematemesis.  Marked splenomegaly. Laboratory correlation for underlying hematological process recommended.    EKG  Normal Sinus Rhythm, 82 bpm, QTc 467ms, GA 128ms, on my read no ST segment elevation or depression, TWI in V1    HPI  48 year old male patient with pmhx cirrhosis who presented to the ER due to hematemesis.  Starting around 9 to 10pm, the patient had 5 episodes of hematemesis with 'a lot of blood'. In the ER patient states he had 1 more episode of hematemesis; but, none since. Hgb of 6.5 in the ER. Patient denies history of GI bleed in the past; but, states 7 years ago he was in a hospital and a hepatologist told him that he has cirrhosis and needs to stop drinking alcohol. Patient has been sober since then for the past 7 years. He recently took ibuprofen every other day for the past week for knee pain. Today he took 2 pills of ibuprofen. He also endorses one episode of melena but that was 15 days ago after eating some cereal and milk; no recent melena and no recent bloody stool. He denies any prior endoscopies.    Review Of Systems included: + hematemesis x6 with 'a lot of blood', + generalized weakness, + melena x1 (15 days ago; not currently), + mild chills,   No bloody stool, no chest pain, no shortness of breath, no abdominal pain, no fever, no prior GI bleeds, no history of splenomegaly as far as patient knows     #[id number]  Physical Exam  General: Awake, Alert, Oriented  Cardiac: RRR  Pulmonary: CTA b/l  Abdominal: Soft, ND, NT  Extremities: No edema b/l    A/P  # Upper GI Bleed  # Elevated Lactic Acid  > Patient states 7 years ago he was hospitalized and told by a hepatologist that he has cirrhosis; so, he has been sober for 7 years since then  > Hgb of 6.5 in the ER --> 6.7 s/p 2 units pRBC  > Lactic acid 2.9 --> 1.5  - Cardiac Telemetry Monitoring  - Consult GI  - Octreotide drip  - No ascites on CT A/P; afebrile; no abdominal pain; Ceftriaxone not indicated as no ascites  - IV PPI BID  - SCDs  - NPO  - IV Fluid Hydration  - Serial CBC Q4H to monitor Hgb  - Transfuse for Hgb < 7  - Low threshold for consulting ICU if becomes hypotensive or has further hematemesis; currently no longer with hematemesis and hemodynamically stable    # Hx of Cirrhosis  # Splenomegaly  > MELD-Na Score of 11 points  - Outpatient follow up    # DVT PPx  - SCDs    # FEN  - NPO  - Monitor and replete electrolytes as needed  - IV Fluid Hydration    Patient case and management was discussed with ER Attending  I did examine all labs (including CBC, PT/INR, APTT, CMP, Lactate, Mg, Lipase, Troponin), imaging, prior notes    Time-based billing (NON-critical care).  76 minutes spent on total encounter excluding any time spent teaching residents. The necessity of the time spent during the encounter on this date of service was due to: Review of records, vital signs, labs, microbiology, and imaging, Ordering labs and/or tests, General physical examination performed, Generation of treatment plan, Counseling of the patient and/or family members IMAGING  CTA C/A/P  IMPRESSION:  No acute aortic pathology.  Wall thickening along the inferior esophagus. Underlying esophageal tear cannot be excluded. If there is clinical concern fluoroscopy could better evaluate.  Stomach moderately distended with combination of fluid and blood products compatible with history of hematemesis.  Marked splenomegaly. Laboratory correlation for underlying hematological process recommended.    EKG  Normal Sinus Rhythm, 82 bpm, QTc 467ms, CO 128ms, on my read no ST segment elevation or depression, TWI in V1    HPI  48 year old male patient with pmhx cirrhosis who presented to the ER due to hematemesis.  Starting around 9 to 10pm, the patient had 5 episodes of hematemesis with 'a lot of blood'. In the ER patient states he had 1 more episode of hematemesis; but, none since. Hgb of 6.5 in the ER. Patient denies history of GI bleed in the past; but, states 7 years ago he was in a hospital and a hepatologist told him that he has cirrhosis and needs to stop drinking alcohol. Patient has been sober since then for the past 7 years. He recently took ibuprofen every other day for the past week for knee pain. Today he took 2 pills of ibuprofen. He also endorses one episode of melena but that was 15 days ago after eating some cereal and milk; no recent melena and no recent bloody stool. He denies any prior endoscopies.    Review Of Systems included: + hematemesis x6 with 'a lot of blood', + generalized weakness, + melena x1 (15 days ago; not currently), + mild chills,   No bloody stool, no chest pain, no shortness of breath, no abdominal pain, no fever, no prior GI bleeds, no history of splenomegaly as far as patient knows     #[id number]  Physical Exam  General: Awake, Alert, Oriented  Cardiac: RRR  Pulmonary: CTA b/l  Abdominal: Soft, ND, NT  Extremities: No edema b/l    A/P  # Upper GI Bleed  # Elevated Lactic Acid  # Thrombocytopenia  > Patient states 7 years ago he was hospitalized and told by a hepatologist that he has cirrhosis; so, he has been sober for 7 years since then  > Hgb of 6.5 in the ER --> 6.7 s/p 2 units pRBC  > Lactic acid 2.9 --> 1.5  - Cardiac Telemetry Monitoring  - Consult GI  - Octreotide drip  - No ascites on CT A/P; afebrile; no abdominal pain; Ceftriaxone not indicated as no ascites  - IV PPI BID  - SCDs  - NPO  - IV Fluid Hydration  - Serial CBC Q4H to monitor Hgb  - Transfuse for Hgb < 7  - Transfuse for PLT < 50k  - Low threshold for consulting ICU if becomes hypotensive or has further hematemesis; currently no longer with hematemesis and hemodynamically stable    # Hx of Cirrhosis  # Splenomegaly  > MELD-Na Score of 11 points  - Outpatient follow up    # DVT PPx  - SCDs    # FEN  - NPO  - Monitor and replete electrolytes as needed  - IV Fluid Hydration    Patient case and management was discussed with ER Attending  I did examine all labs (including CBC, PT/INR, APTT, CMP, Lactate, Mg, Lipase, Troponin), imaging, prior notes    Time-based billing (NON-critical care).  76 minutes spent on total encounter excluding any time spent teaching residents. The necessity of the time spent during the encounter on this date of service was due to: Review of records, vital signs, labs, microbiology, and imaging, Ordering labs and/or tests, General physical examination performed, Generation of treatment plan, Counseling of the patient and/or family members

## 2024-09-25 NOTE — H&P ADULT - ASSESSMENT
48-year-old male with pmhx of cirrhosis and right knee pain presenting with vomiting blood. Hemoglobin 6.5 , HCT 21.2. CT angio: Wall thickening along the inferior esophagus. Underlying esophageal tear cannot be excluded. Stomach moderately distended with combination of fluid and blood products. Marked splenomegaly. Patient is being admitted for GI bleed likely from underlying varices.
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
## 2024-09-25 NOTE — CONSULT NOTE ADULT - ASSESSMENT
Assessment:    Plan:  Neuro:    Cardiovascular:    Pulmonary:     Infectious Diseases:    Gastrointestinal:    Renal:    Heme/onc:     Endo:     Skin/ catheter:     Prophylaxis:     Goals of Care:     Dispo:   Assessment:  48-year-old male with pmhx of cirrhosis and right knee pain presenting with hematemesis. Patient was in endoscopy suite getting EGD RRT called for perfuse hematemesis. Patient was intubated. EGD performed, banded. MTP called received 2PRBC and 1FFP Cordis placed, arterial line placed. Patient transferred to ICU for variceal bleed.     Plan:  #Variceal Bleed  #Melena  #Cirrhosis   #Thrombocytopenia   #Anemia 2/2 blood loss  #AHRF 2/2 GIB  #Sedated     Neuro:  #Sedated   on propofol   RAAS score -2 to -3  SAT per protocol     Cardiovascular:  #No issues     Pulmonary:   #AHRF 2/2 GIB  intubated for airway protection   7.1/69/301/21  SBT per protocol   Daily CXR     Infectious Diseases:  #No issues    Gastrointestinal:  #Variceal Bleed  #Melena  #Cirrhosis   p/w hematemesis  Hb: 6.5> 1PRBC, 1PLT> 7.2> 2PRBC, 2FFP, 2PLT  s/p EGD w/ banding  c/w octreotide drip  PPI IV BID  MELD Score daily   GI Dr. Camargo     Renal:  #Hypernatremia  2/2 poor oral intake  Trend     Heme/onc:   #Thrombocytopenia   #Anemia 2/2 blood loss  plt 49 2/2 cirrhosis   s/p Hb: 6.5> 1PRBC, 1PLT> 7.2> 2PRBC, 2FFP, 2PLT>   Maintain active type and screen   Transfuse for Hb < 7   Trend CBC q 6     Endo:   #No issues     Skin/ catheter:   #Cordis Right femoral 9/25/24  #Peripheral IVs   #Arterial line Right radial 9/25/24    Prophylaxis:   #DVT ppx SCD    Goals of Care: Full code     Dispo: ICU

## 2024-09-25 NOTE — PROCEDURE NOTE - ADDITIONAL PROCEDURE DETAILS
Emergent cordis placed in right femoral vein under ultrasound guidance during massive transfusion protocol for variceal bleeding. Aspirated and flushed appropriately. VBG and US visualization confirmed venous placement. Sutured in placed and sterile dressing applied

## 2024-09-25 NOTE — CONSULT NOTE ADULT - SUBJECTIVE AND OBJECTIVE BOX
INITIAL GI CONSULTATION    Patient is a 48y old  Male who presents with a chief complaint of GI bleed (25 Sep 2024 01:35)    HPI:   48-year-old male with pmhx of cirrhosis and right knee pain presenting with fvomiting blood.  Patient reports he has been vomiting blood since 9pm yesterday with about 5 episodes total. Patient reports he has been having some R knee pains earlier today and took  ibuprofen which has been taking every other day for one week. He reports a history of cirrhosis which he was hospitalized 7 years ago for drinking. He admits to dizziness and lightheadedness. Also admits to one episode of black stool 2 weeks ago.  He reports he has not drunk alcohol for the past 7 years.  Denies any other recreational drug use. Denies any cp, SOB,  abdominal pain, diarrhea or dysuria.  States he has never vomited blood before or had a blood transfusion.     ED course:  Vitals: temp 97.6 , HR 82, BP 84/58-> 99/62  s/p 2uprbc , 80 mg of ppi, 2LNS, zofran   hemoglobin 6.5 , HCT 21.2  CT angio: Wall thickening along the inferior esophagus. Underlying esophageal tear cannot be excluded. Stomach moderately distended with combination of fluid and blood products. Marked splenomegaly. (25 Sep 2024 01:35)    OVN events:  Examined patient this morning at bedside. Patient endorses feeling better overall, with slight left sided abdominal pain. Denies any further episodes of hematemesis for a total of 6 episodes (5 since 9pm the night prior to admission and once in the ED). Patient never had EGD/Colonoscopy done before. Mild dizziness when standing, but denies any fever, nausea, CP, SOB, diarrhea/constipation.       PMH:  Cirrhosis        MEDS:  MEDICATIONS  (STANDING):  influenza   Vaccine 0.5 milliLiter(s) IntraMuscular once  lactated ringers. 1000 milliLiter(s) (90 mL/Hr) IV Continuous <Continuous>  octreotide  Infusion 50 MICROgram(s)/Hr (10 mL/Hr) IV Continuous <Continuous>  pantoprazole  Injectable 40 milliGRAM(s) IV Push two times a day    MEDICATIONS  (PRN):  acetaminophen     Tablet .. 650 milliGRAM(s) Oral every 6 hours PRN Temp greater or equal to 38C (100.4F), Mild Pain (1 - 3)  ondansetron Injectable 4 milliGRAM(s) IV Push every 8 hours PRN Nausea and/or Vomiting    Allergies    No Known Allergies    Intolerances          ROS: A detailed set of ROS were asked and negative except those outlined in GI HPI.  ______________________________________________________________________  PHYSICAL EXAM:  T(C): 36.8 (09-25-24 @ 11:44), Max: 36.9 (09-25-24 @ 07:31)  HR: 60 (09-25-24 @ 11:44)  BP: 113/70 (09-25-24 @ 11:44)  RR: 18 (09-25-24 @ 11:44)  SpO2: 99% (09-25-24 @ 11:44)  Wt(kg): --      GEN: NAD  HEENT: EOMI, conjunctivae anicteric, neck supple, moist mucous membranes  PULM: LSCTAB, no wheezing, rales, or rhonchi  CV: RRR, no m/r/b  GI: Soft, NT, ND; +BS in all four quadrants, no ascites, no Mckeon's sign  MSK: CONKLIN, no edema  NEURO: A&O x 3, no gross deficits  ______________________________________________________________________  LABS:                        7.2    2.68  )-----------( 49       ( 25 Sep 2024 10:10 )             22.0     09-25    147[H]  |  117[H]  |  23[H]  ----------------------------<  121[H]  3.9   |  22  |  0.79    Ca    7.8[L]      25 Sep 2024 10:10  Phos  3.4     09-25  Mg     1.8     09-25    TPro  5.4[L]  /  Alb  2.9[L]  /  TBili  0.6  /  DBili  x   /  AST  20  /  ALT  38  /  AlkPhos  81  09-25    LIVER FUNCTIONS - ( 25 Sep 2024 10:10 )  Alb: 2.9 g/dL / Pro: 5.4 g/dL / ALK PHOS: 81 U/L / ALT: 38 U/L DA / AST: 20 U/L / GGT: x           PT/INR - ( 24 Sep 2024 22:01 )   PT: 15.2 sec;   INR: 1.30 ratio         PTT - ( 24 Sep 2024 22:01 )  PTT:25.8 sec  ____________________________________________    IMAGING:    < from: CT Angio Abdomen and Pelvis w/ IV Cont (09.24.24 @ 22:23) >  PROCEDURE:  CT Angiography of the Abdomen and Pelvis.  Arterial phase images were acquired.  Sagittal and coronal reformats were performed as well as 3D (MIP)   reconstructions.    FINDINGS:  LOWER CHEST: Lung bases clear.    LIVER: Within normal limits.  BILE DUCTS: Normal caliber.  GALLBLADDER: Cholelithiasis.  SPLEEN: Extensive splenomegaly measuring up to 22 cmin craniocaudal   dimension.  PANCREAS: Within normal limits.  ADRENALS: Within normal limits.  KIDNEYS/URETERS: Within normal limits.    BLADDER: Minimally distended.  REPRODUCTIVE ORGANS: Prostate within normal limits.    BOWEL: Wall thickening along the inferior esophagus. Underlying   esophageal tear cannot be excluded. Stomach moderately distended with   combination of fluid and blood products compatible with history of   hematemesis. No bowel obstruction. Appendix is not visualized. No   evidence of inflammation in the pericecal region.  PERITONEUM/RETROPERITONEUM: Within normal limits.  VESSELS: Aorta unremarkable. Patent origins of the celiac axis, SMA, JANKI   and renal arteries. No stenosis or vascular abnormality appreciated.  LYMPH NODES: No lymphadenopathy.  ABDOMINAL WALL: Within normal limits.  BONES: Within normal limits.    IMPRESSION:  No vascular pathology within the abdomen or pelvis.    Wall thickening along the inferior esophagus. Underlying esophageal tear   cannot beexcluded. If there is clinical concern fluoroscopy could better   evaluate.    Stomach moderately distended with combination of fluid and blood products   compatible with history of hematemesis.    Marked splenomegaly. Laboratory correlation for underlying hematological   process recommended.    < end of copied text >        This note and its recommendations herein are preliminary until such time as cosigned by an attending.    GI will continue to follow.  Thank you for this consult! INITIAL GI CONSULTATION    Patient is a 48y old  Male who presents with a chief complaint of GI bleed (25 Sep 2024 01:35)    HPI:   48-year-old male with pmhx of cirrhosis and right knee pain presenting with fvomiting blood.  Patient reports he has been vomiting blood since 9pm yesterday with about 5 episodes total. Patient reports he has been having some R knee pains earlier today and took  ibuprofen which has been taking every other day for one week. He reports a history of cirrhosis which he was hospitalized 7 years ago for drinking. He admits to dizziness and lightheadedness. Also admits to one episode of black stool 2 weeks ago.  He reports he has not drunk alcohol for the past 7 years.  Denies any other recreational drug use. Denies any cp, SOB,  abdominal pain, diarrhea or dysuria.  States he has never vomited blood before or had a blood transfusion.     ED course:  Vitals: temp 97.6 , HR 82, BP 84/58-> 99/62  s/p 2uprbc , 80 mg of ppi, 2LNS, zofran   hemoglobin 6.5 , HCT 21.2  CT angio: Wall thickening along the inferior esophagus. Underlying esophageal tear cannot be excluded. Stomach moderately distended with combination of fluid and blood products. Marked splenomegaly. (25 Sep 2024 01:35)    Interval HPI/OVN events:  Examined patient this morning at bedside. Patient endorses feeling better overall, with slight left sided abdominal pain. Denies any further episodes of hematemesis for a total of 6 episodes (5 since 9pm the night prior to admission and once in the ED). Patient never had EGD/Colonoscopy done before. Mild dizziness when standing, but denies any fever, nausea, CP, SOB, diarrhea/constipation.     ( Deniz #[id number])    PMH:  Cirrhosis        MEDS:  MEDICATIONS  (STANDING):  influenza   Vaccine 0.5 milliLiter(s) IntraMuscular once  lactated ringers. 1000 milliLiter(s) (90 mL/Hr) IV Continuous <Continuous>  octreotide  Infusion 50 MICROgram(s)/Hr (10 mL/Hr) IV Continuous <Continuous>  pantoprazole  Injectable 40 milliGRAM(s) IV Push two times a day    MEDICATIONS  (PRN):  acetaminophen     Tablet .. 650 milliGRAM(s) Oral every 6 hours PRN Temp greater or equal to 38C (100.4F), Mild Pain (1 - 3)  ondansetron Injectable 4 milliGRAM(s) IV Push every 8 hours PRN Nausea and/or Vomiting    Allergies    No Known Allergies    Intolerances          ROS: A detailed set of ROS were asked and negative except those outlined in GI HPI.  ______________________________________________________________________  PHYSICAL EXAM:  T(C): 36.8 (09-25-24 @ 11:44), Max: 36.9 (09-25-24 @ 07:31)  HR: 60 (09-25-24 @ 11:44)  BP: 113/70 (09-25-24 @ 11:44)  RR: 18 (09-25-24 @ 11:44)  SpO2: 99% (09-25-24 @ 11:44)  Wt(kg): --      GEN: NAD  HEENT: EOMI, conjunctivae anicteric, neck supple, moist mucous membranes  PULM: LSCTAB, no wheezing, rales, or rhonchi  CV: RRR, no m/r/b  GI: Soft, NT, ND; +BS in all four quadrants, no ascites, no Mckeon's sign  MSK: CONKLIN, no edema  NEURO: A&O x 3, no gross deficits  ______________________________________________________________________  LABS:                        7.2    2.68  )-----------( 49       ( 25 Sep 2024 10:10 )             22.0     09-25    147[H]  |  117[H]  |  23[H]  ----------------------------<  121[H]  3.9   |  22  |  0.79    Ca    7.8[L]      25 Sep 2024 10:10  Phos  3.4     09-25  Mg     1.8     09-25    TPro  5.4[L]  /  Alb  2.9[L]  /  TBili  0.6  /  DBili  x   /  AST  20  /  ALT  38  /  AlkPhos  81  09-25    LIVER FUNCTIONS - ( 25 Sep 2024 10:10 )  Alb: 2.9 g/dL / Pro: 5.4 g/dL / ALK PHOS: 81 U/L / ALT: 38 U/L DA / AST: 20 U/L / GGT: x           PT/INR - ( 24 Sep 2024 22:01 )   PT: 15.2 sec;   INR: 1.30 ratio         PTT - ( 24 Sep 2024 22:01 )  PTT:25.8 sec  ____________________________________________    IMAGING:    < from: CT Angio Abdomen and Pelvis w/ IV Cont (09.24.24 @ 22:23) >  PROCEDURE:  CT Angiography of the Abdomen and Pelvis.  Arterial phase images were acquired.  Sagittal and coronal reformats were performed as well as 3D (MIP)   reconstructions.    FINDINGS:  LOWER CHEST: Lung bases clear.    LIVER: Within normal limits.  BILE DUCTS: Normal caliber.  GALLBLADDER: Cholelithiasis.  SPLEEN: Extensive splenomegaly measuring up to 22 cmin craniocaudal   dimension.  PANCREAS: Within normal limits.  ADRENALS: Within normal limits.  KIDNEYS/URETERS: Within normal limits.    BLADDER: Minimally distended.  REPRODUCTIVE ORGANS: Prostate within normal limits.    BOWEL: Wall thickening along the inferior esophagus. Underlying   esophageal tear cannot be excluded. Stomach moderately distended with   combination of fluid and blood products compatible with history of   hematemesis. No bowel obstruction. Appendix is not visualized. No   evidence of inflammation in the pericecal region.  PERITONEUM/RETROPERITONEUM: Within normal limits.  VESSELS: Aorta unremarkable. Patent origins of the celiac axis, SMA, JANKI   and renal arteries. No stenosis or vascular abnormality appreciated.  LYMPH NODES: No lymphadenopathy.  ABDOMINAL WALL: Within normal limits.  BONES: Within normal limits.    IMPRESSION:  No vascular pathology within the abdomen or pelvis.    Wall thickening along the inferior esophagus. Underlying esophageal tear   cannot beexcluded. If there is clinical concern fluoroscopy could better   evaluate.    Stomach moderately distended with combination of fluid and blood products   compatible with history of hematemesis.    Marked splenomegaly. Laboratory correlation for underlying hematological   process recommended.    < end of copied text >        This note and its recommendations herein are preliminary until such time as cosigned by an attending.    GI will continue to follow.  Thank you for this consult! INITIAL GI CONSULTATION    Patient is a 48y old  Male who presents with a chief complaint of GI bleed (25 Sep 2024 01:35)    HPI:   48-year-old male with pmhx of cirrhosis and right knee pain presenting with vomiting blood.  Patient reports he has been vomiting blood since 9pm yesterday with about 5 episodes total. Patient reports he has been having some R knee pains earlier today and took  ibuprofen which has been taking every other day for one week. He reports a history of cirrhosis which he was hospitalized 7 years ago for drinking. He admits to dizziness and lightheadedness. Also admits to one episode of black stool 2 weeks ago.  He reports he has not drunk alcohol for the past 7 years.  Denies any other recreational drug use. Denies any cp, SOB,  abdominal pain, diarrhea or dysuria.  States he has never vomited blood before or had a blood transfusion.     ED course:  Vitals: temp 97.6 , HR 82, BP 84/58-> 99/62  s/p 2uprbc , 80 mg of ppi, 2LNS, zofran   hemoglobin 6.5 , HCT 21.2  CT angio: Wall thickening along the inferior esophagus. Underlying esophageal tear cannot be excluded. Stomach moderately distended with combination of fluid and blood products. Marked splenomegaly. (25 Sep 2024 01:35)    Interval HPI/OVN events:  Examined patient this morning at bedside. Patient endorses feeling better overall, with slight left sided abdominal pain. Denies any further episodes of hematemesis for a total of 6 episodes (5 since 9pm the night prior to admission and once in the ED). Patient never had EGD/Colonoscopy done before. Mild dizziness when standing, but denies any fever, nausea, CP, SOB, diarrhea/constipation.     ( Deniz #[id number])    PMH:  Cirrhosis        MEDS:  MEDICATIONS  (STANDING):  influenza   Vaccine 0.5 milliLiter(s) IntraMuscular once  lactated ringers. 1000 milliLiter(s) (90 mL/Hr) IV Continuous <Continuous>  octreotide  Infusion 50 MICROgram(s)/Hr (10 mL/Hr) IV Continuous <Continuous>  pantoprazole  Injectable 40 milliGRAM(s) IV Push two times a day    MEDICATIONS  (PRN):  acetaminophen     Tablet .. 650 milliGRAM(s) Oral every 6 hours PRN Temp greater or equal to 38C (100.4F), Mild Pain (1 - 3)  ondansetron Injectable 4 milliGRAM(s) IV Push every 8 hours PRN Nausea and/or Vomiting    Allergies    No Known Allergies    Intolerances          ROS: A detailed set of ROS were asked and negative except those outlined in GI HPI.  ______________________________________________________________________  PHYSICAL EXAM:  T(C): 36.8 (09-25-24 @ 11:44), Max: 36.9 (09-25-24 @ 07:31)  HR: 60 (09-25-24 @ 11:44)  BP: 113/70 (09-25-24 @ 11:44)  RR: 18 (09-25-24 @ 11:44)  SpO2: 99% (09-25-24 @ 11:44)  Wt(kg): --      GEN: NAD  HEENT: EOMI, conjunctivae anicteric, neck supple, moist mucous membranes  PULM: LSCTAB, no wheezing, rales, or rhonchi  CV: RRR, no m/r/b  GI: Soft, NT, ND; +BS in all four quadrants, no ascites, no Mckeon's sign  MSK: CONKLIN, no edema  NEURO: A&O x 3, no gross deficits  ______________________________________________________________________  LABS:                        7.2    2.68  )-----------( 49       ( 25 Sep 2024 10:10 )             22.0     09-25    147[H]  |  117[H]  |  23[H]  ----------------------------<  121[H]  3.9   |  22  |  0.79    Ca    7.8[L]      25 Sep 2024 10:10  Phos  3.4     09-25  Mg     1.8     09-25    TPro  5.4[L]  /  Alb  2.9[L]  /  TBili  0.6  /  DBili  x   /  AST  20  /  ALT  38  /  AlkPhos  81  09-25    LIVER FUNCTIONS - ( 25 Sep 2024 10:10 )  Alb: 2.9 g/dL / Pro: 5.4 g/dL / ALK PHOS: 81 U/L / ALT: 38 U/L DA / AST: 20 U/L / GGT: x           PT/INR - ( 24 Sep 2024 22:01 )   PT: 15.2 sec;   INR: 1.30 ratio         PTT - ( 24 Sep 2024 22:01 )  PTT:25.8 sec  ____________________________________________    IMAGING:    < from: CT Angio Abdomen and Pelvis w/ IV Cont (09.24.24 @ 22:23) >  PROCEDURE:  CT Angiography of the Abdomen and Pelvis.  Arterial phase images were acquired.  Sagittal and coronal reformats were performed as well as 3D (MIP)   reconstructions.    FINDINGS:  LOWER CHEST: Lung bases clear.    LIVER: Within normal limits.  BILE DUCTS: Normal caliber.  GALLBLADDER: Cholelithiasis.  SPLEEN: Extensive splenomegaly measuring up to 22 cmin craniocaudal   dimension.  PANCREAS: Within normal limits.  ADRENALS: Within normal limits.  KIDNEYS/URETERS: Within normal limits.    BLADDER: Minimally distended.  REPRODUCTIVE ORGANS: Prostate within normal limits.    BOWEL: Wall thickening along the inferior esophagus. Underlying   esophageal tear cannot be excluded. Stomach moderately distended with   combination of fluid and blood products compatible with history of   hematemesis. No bowel obstruction. Appendix is not visualized. No   evidence of inflammation in the pericecal region.  PERITONEUM/RETROPERITONEUM: Within normal limits.  VESSELS: Aorta unremarkable. Patent origins of the celiac axis, SMA, JANKI   and renal arteries. No stenosis or vascular abnormality appreciated.  LYMPH NODES: No lymphadenopathy.  ABDOMINAL WALL: Within normal limits.  BONES: Within normal limits.    IMPRESSION:  No vascular pathology within the abdomen or pelvis.    Wall thickening along the inferior esophagus. Underlying esophageal tear   cannot beexcluded. If there is clinical concern fluoroscopy could better   evaluate.    Stomach moderately distended with combination of fluid and blood products   compatible with history of hematemesis.    Marked splenomegaly. Laboratory correlation for underlying hematological   process recommended.    < end of copied text >        This note and its recommendations herein are preliminary until such time as cosigned by an attending.    GI will continue to follow.  Thank you for this consult!

## 2024-09-25 NOTE — RAPID RESPONSE TEAM SUMMARY - NSOTHERINTERVENTIONSRRT_GEN_ALL_CORE
Received 2PRBC and 1FFP   Cordis placed in right femoral vein, arterial line placed in right radial artery.   Full details in ICU consult note

## 2024-09-25 NOTE — H&P ADULT - HISTORY OF PRESENT ILLNESS
48-year-old male with pmhx of cirrhosis and right knee pain presenting with fvomiting blood.  Patient reports he has been vomiting blood since 9pm yesterday with about 5 episodes total. Patient reports he has been having some R knee pains earlier today and took  ibuprofen which has been taking every other day for one week. He reports a history of cirrhosis which he was hospitalized 7 years ago for drinking. He admits to dizziness and lightheadedness. Also admits to one episode of black stool 2 weeks ago.  He reports he has not drunk alcohol for the past 7 years. . Denies any other recreational drug use. Denies any cp, SOB,  abdominal pain, diarrhea or dysuria.  States he has never vomited blood before or had a blood transfusion.     ED course:  Vitals: temp 97.6 , HR 82, BP 84/58-> 99/62  s/p 2 units pf PRBC , 80 mg of ppi, 2L of nacl, zofran   hemoglobin 6.5 , HCT 21.2  CT angio: Wall thickening along the inferior esophagus. Underlying esophageal tear cannot be excluded. Stomach moderately distended with combination of fluid and blood products. Marked splenomegaly.

## 2024-09-25 NOTE — H&P ADULT - PROBLEM SELECTOR PLAN 2
hx of cirrhosis   reports has not drank in the past 7 years  does report previous hospitalization for alcohol  that he was told he has cirrhosis   afebrile and no ascites on CT  low suspicion for SBP , no need for prophylactic abx at this time.   see rest as above

## 2024-09-25 NOTE — PRE-OP CHECKLIST - BP NONINVASIVE SYSTOLIC (MM HG)
0500 TRANSFER - IN REPORT:    Verbal report received from [place] (name) on Paty Sis  being received from [facility](unit) for urgent transfer      Report consisted of patients Situation, Background, Assessment and   Recommendations(SBAR). Information from the following report(s) SBAR, Kardex, ED Summary, Intake/Output, MAR and Recent Results was reviewed with the receiving nurse. Opportunity for questions and clarification was provided. Assessment completed upon patients arrival to unit and care assumed.      Primary Nurse Brandi Geronimo RN and NOELLE Hernandez performed a dual skin assessment on this patient No impairment noted    Current Bed:     [facility]    Kip score is [phone]

## 2024-09-25 NOTE — CONSULT NOTE ADULT - ATTENDING COMMENTS
48-year-old male with pmhx of cirrhosis and right knee pain presenting with hematemesis. Patient was in endoscopy suite getting EGD RRT called for perfuse hematemesis. Patient was intubated. EGD performed, banded. MTP called received 2PRBC and 1FFP Cordis placed, arterial line placed. Patient transferred to ICU for variceal bleed.     Plan:  1. Hematemesis   2. Esophageal Variceal bleeding  3. Acute blood loss anemia   4. Cirrhosis   5. Thrombocytopenia   6. Lactic acidosis  7. Acute hypoxic respiratory failure  8. Aspiration pneumonitis     Plan:  - Monitor for signs of further bleeding  - S/p Endoscopy with 4 bands deployed by GI  - Cont. Octreotide   - Monitor hgb, transfuse to hgb > 7, avoid over transfusions   - Hemodynamic monitoring  - Empiric antibiotics   - Discussed with GI attending, if rebleeds recommends transfer for TIPS  - Thiamine and folate supplementation   - Sedation for vent synchrony  - Adjusted vent for ABG  - Cont. mechanical ventilation   - Non chemical DVT prophylaxis  - Admit to ICU post endoscopy
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
## 2024-09-25 NOTE — PATIENT PROFILE ADULT - FALL HARM RISK - HARM RISK INTERVENTIONS
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
## 2024-09-26 LAB
ALBUMIN SERPL ELPH-MCNC: 2.9 G/DL — LOW (ref 3.5–5)
ALP SERPL-CCNC: 71 U/L — SIGNIFICANT CHANGE UP (ref 40–120)
ALT FLD-CCNC: 34 U/L DA — SIGNIFICANT CHANGE UP (ref 10–60)
ANION GAP SERPL CALC-SCNC: 7 MMOL/L — SIGNIFICANT CHANGE UP (ref 5–17)
AST SERPL-CCNC: 15 U/L — SIGNIFICANT CHANGE UP (ref 10–40)
BASE EXCESS BLDA CALC-SCNC: -1.3 MMOL/L — SIGNIFICANT CHANGE UP (ref -2–3)
BASOPHILS # BLD AUTO: 0.01 K/UL — SIGNIFICANT CHANGE UP (ref 0–0.2)
BASOPHILS NFR BLD AUTO: 0.3 % — SIGNIFICANT CHANGE UP (ref 0–2)
BILIRUB SERPL-MCNC: 0.6 MG/DL — SIGNIFICANT CHANGE UP (ref 0.2–1.2)
BLOOD GAS COMMENTS ARTERIAL: SIGNIFICANT CHANGE UP
BUN SERPL-MCNC: 22 MG/DL — HIGH (ref 7–18)
CALCIUM SERPL-MCNC: 7.5 MG/DL — LOW (ref 8.4–10.5)
CHLORIDE SERPL-SCNC: 116 MMOL/L — HIGH (ref 96–108)
CO2 SERPL-SCNC: 23 MMOL/L — SIGNIFICANT CHANGE UP (ref 22–31)
CREAT SERPL-MCNC: 0.94 MG/DL — SIGNIFICANT CHANGE UP (ref 0.5–1.3)
EGFR: 101 ML/MIN/1.73M2 — SIGNIFICANT CHANGE UP
EOSINOPHIL # BLD AUTO: 0.09 K/UL — SIGNIFICANT CHANGE UP (ref 0–0.5)
EOSINOPHIL NFR BLD AUTO: 3 % — SIGNIFICANT CHANGE UP (ref 0–6)
GLUCOSE BLDC GLUCOMTR-MCNC: 119 MG/DL — HIGH (ref 70–99)
GLUCOSE SERPL-MCNC: 111 MG/DL — HIGH (ref 70–99)
GRAM STN FLD: SIGNIFICANT CHANGE UP
HCO3 BLDA-SCNC: 24 MMOL/L — SIGNIFICANT CHANGE UP (ref 21–28)
HCT VFR BLD CALC: 21.4 % — LOW (ref 39–50)
HCT VFR BLD CALC: 21.5 % — LOW (ref 39–50)
HCT VFR BLD CALC: 22.6 % — LOW (ref 39–50)
HCT VFR BLD CALC: 22.7 % — LOW (ref 39–50)
HGB BLD-MCNC: 7.1 G/DL — LOW (ref 13–17)
HGB BLD-MCNC: 7.2 G/DL — LOW (ref 13–17)
HGB BLD-MCNC: 7.4 G/DL — LOW (ref 13–17)
HGB BLD-MCNC: 7.5 G/DL — LOW (ref 13–17)
HOROWITZ INDEX BLDA+IHG-RTO: 40 — SIGNIFICANT CHANGE UP
IMM GRANULOCYTES NFR BLD AUTO: 0.3 % — SIGNIFICANT CHANGE UP (ref 0–0.9)
INR BLD: 1.25 RATIO — HIGH (ref 0.85–1.16)
LYMPHOCYTES # BLD AUTO: 0.49 K/UL — LOW (ref 1–3.3)
LYMPHOCYTES # BLD AUTO: 16.3 % — SIGNIFICANT CHANGE UP (ref 13–44)
MAGNESIUM SERPL-MCNC: 1.9 MG/DL — SIGNIFICANT CHANGE UP (ref 1.6–2.6)
MCHC RBC-ENTMCNC: 26.8 PG — LOW (ref 27–34)
MCHC RBC-ENTMCNC: 27 PG — SIGNIFICANT CHANGE UP (ref 27–34)
MCHC RBC-ENTMCNC: 27.1 PG — SIGNIFICANT CHANGE UP (ref 27–34)
MCHC RBC-ENTMCNC: 27.3 PG — SIGNIFICANT CHANGE UP (ref 27–34)
MCHC RBC-ENTMCNC: 32.7 GM/DL — SIGNIFICANT CHANGE UP (ref 32–36)
MCHC RBC-ENTMCNC: 33 GM/DL — SIGNIFICANT CHANGE UP (ref 32–36)
MCHC RBC-ENTMCNC: 33.2 GM/DL — SIGNIFICANT CHANGE UP (ref 32–36)
MCHC RBC-ENTMCNC: 33.5 GM/DL — SIGNIFICANT CHANGE UP (ref 32–36)
MCV RBC AUTO: 81.4 FL — SIGNIFICANT CHANGE UP (ref 80–100)
MCV RBC AUTO: 81.7 FL — SIGNIFICANT CHANGE UP (ref 80–100)
MCV RBC AUTO: 81.7 FL — SIGNIFICANT CHANGE UP (ref 80–100)
MCV RBC AUTO: 81.9 FL — SIGNIFICANT CHANGE UP (ref 80–100)
MELD SCORE WITH DIALYSIS: 22 POINTS — SIGNIFICANT CHANGE UP
MELD SCORE WITHOUT DIALYSIS: 9 POINTS — SIGNIFICANT CHANGE UP
MONOCYTES # BLD AUTO: 0.23 K/UL — SIGNIFICANT CHANGE UP (ref 0–0.9)
MONOCYTES NFR BLD AUTO: 7.7 % — SIGNIFICANT CHANGE UP (ref 2–14)
MRSA PCR RESULT.: SIGNIFICANT CHANGE UP
NEUTROPHILS # BLD AUTO: 2.17 K/UL — SIGNIFICANT CHANGE UP (ref 1.8–7.4)
NEUTROPHILS NFR BLD AUTO: 72.4 % — SIGNIFICANT CHANGE UP (ref 43–77)
NRBC # BLD: 0 /100 WBCS — SIGNIFICANT CHANGE UP (ref 0–0)
PCO2 BLDA: 40 MMHG — SIGNIFICANT CHANGE UP (ref 35–48)
PH BLDA: 7.38 — SIGNIFICANT CHANGE UP (ref 7.35–7.45)
PHOSPHATE SERPL-MCNC: 3.6 MG/DL — SIGNIFICANT CHANGE UP (ref 2.5–4.5)
PLATELET # BLD AUTO: 47 K/UL — LOW (ref 150–400)
PLATELET # BLD AUTO: 48 K/UL — LOW (ref 150–400)
PLATELET # BLD AUTO: 53 K/UL — LOW (ref 150–400)
PLATELET # BLD AUTO: 54 K/UL — LOW (ref 150–400)
PO2 BLDA: 136 MMHG — HIGH (ref 83–108)
POTASSIUM SERPL-MCNC: 3.6 MMOL/L — SIGNIFICANT CHANGE UP (ref 3.5–5.3)
POTASSIUM SERPL-SCNC: 3.6 MMOL/L — SIGNIFICANT CHANGE UP (ref 3.5–5.3)
PROT SERPL-MCNC: 5.3 G/DL — LOW (ref 6–8.3)
PROTHROM AB SERPL-ACNC: 14.5 SEC — HIGH (ref 9.9–13.4)
RBC # BLD: 2.62 M/UL — LOW (ref 4.2–5.8)
RBC # BLD: 2.64 M/UL — LOW (ref 4.2–5.8)
RBC # BLD: 2.76 M/UL — LOW (ref 4.2–5.8)
RBC # BLD: 2.78 M/UL — LOW (ref 4.2–5.8)
RBC # FLD: 16.6 % — HIGH (ref 10.3–14.5)
RBC # FLD: 16.7 % — HIGH (ref 10.3–14.5)
RBC # FLD: 16.9 % — HIGH (ref 10.3–14.5)
RBC # FLD: 16.9 % — HIGH (ref 10.3–14.5)
S AUREUS DNA NOSE QL NAA+PROBE: SIGNIFICANT CHANGE UP
SAO2 % BLDA: 100 % — SIGNIFICANT CHANGE UP
SODIUM SERPL-SCNC: 146 MMOL/L — HIGH (ref 135–145)
SPECIMEN SOURCE: SIGNIFICANT CHANGE UP
WBC # BLD: 3 K/UL — LOW (ref 3.8–10.5)
WBC # BLD: 3.31 K/UL — LOW (ref 3.8–10.5)
WBC # BLD: 3.54 K/UL — LOW (ref 3.8–10.5)
WBC # BLD: 3.87 K/UL — SIGNIFICANT CHANGE UP (ref 3.8–10.5)
WBC # FLD AUTO: 3 K/UL — LOW (ref 3.8–10.5)
WBC # FLD AUTO: 3.31 K/UL — LOW (ref 3.8–10.5)
WBC # FLD AUTO: 3.54 K/UL — LOW (ref 3.8–10.5)
WBC # FLD AUTO: 3.87 K/UL — SIGNIFICANT CHANGE UP (ref 3.8–10.5)

## 2024-09-26 PROCEDURE — 99232 SBSQ HOSP IP/OBS MODERATE 35: CPT | Mod: GC

## 2024-09-26 RX ADMIN — CHLORHEXIDINE GLUCONATE ORAL RINSE 15 MILLILITER(S): 1.2 SOLUTION DENTAL at 05:06

## 2024-09-26 RX ADMIN — THIAMINE HYDROCHLORIDE 105 MILLIGRAM(S): 100 INJECTION, SOLUTION INTRAMUSCULAR; INTRAVENOUS at 21:41

## 2024-09-26 RX ADMIN — OCTREOTIDE ACETATE 10 MICROGRAM(S)/HR: 50 INJECTION, SOLUTION INTRAVENOUS; SUBCUTANEOUS at 05:55

## 2024-09-26 RX ADMIN — THIAMINE HYDROCHLORIDE 105 MILLIGRAM(S): 100 INJECTION, SOLUTION INTRAMUSCULAR; INTRAVENOUS at 05:06

## 2024-09-26 RX ADMIN — PROPOFOL 13.8 MICROGRAM(S)/KG/MIN: 10 INJECTION, EMULSION INTRAVENOUS at 00:30

## 2024-09-26 RX ADMIN — OCTREOTIDE ACETATE 10 MICROGRAM(S)/HR: 50 INJECTION, SOLUTION INTRAVENOUS; SUBCUTANEOUS at 15:38

## 2024-09-26 RX ADMIN — THIAMINE HYDROCHLORIDE 105 MILLIGRAM(S): 100 INJECTION, SOLUTION INTRAMUSCULAR; INTRAVENOUS at 14:21

## 2024-09-26 RX ADMIN — FOLIC ACID 1 MILLIGRAM(S): 1 TABLET ORAL at 12:14

## 2024-09-26 RX ADMIN — CHLORHEXIDINE GLUCONATE ORAL RINSE 1 APPLICATION(S): 1.2 SOLUTION DENTAL at 05:07

## 2024-09-26 RX ADMIN — PANTOPRAZOLE SODIUM 40 MILLIGRAM(S): 40 TABLET, DELAYED RELEASE ORAL at 05:06

## 2024-09-26 RX ADMIN — PANTOPRAZOLE SODIUM 40 MILLIGRAM(S): 40 TABLET, DELAYED RELEASE ORAL at 17:47

## 2024-09-26 RX ADMIN — PIPERACILLIN SODIUM AND TAZOBACTAM SODIUM 25 GRAM(S): 12; 1.5 INJECTION, POWDER, LYOPHILIZED, FOR SOLUTION INTRAVENOUS at 05:06

## 2024-09-26 RX ADMIN — PIPERACILLIN SODIUM AND TAZOBACTAM SODIUM 25 GRAM(S): 12; 1.5 INJECTION, POWDER, LYOPHILIZED, FOR SOLUTION INTRAVENOUS at 14:21

## 2024-09-26 RX ADMIN — PIPERACILLIN SODIUM AND TAZOBACTAM SODIUM 25 GRAM(S): 12; 1.5 INJECTION, POWDER, LYOPHILIZED, FOR SOLUTION INTRAVENOUS at 21:41

## 2024-09-26 RX ADMIN — PROPOFOL 13.8 MICROGRAM(S)/KG/MIN: 10 INJECTION, EMULSION INTRAVENOUS at 06:42

## 2024-09-26 NOTE — CONSULT NOTE ADULT - ASSESSMENT
Aspiration Pneumonia ??  Fever - low grade      Plan - Cont Zosyn 3.375 gms iv q8hrs for now  repeat CXR if negative then will DC antibiotics in a couple of days.

## 2024-09-26 NOTE — CONSULT NOTE ADULT - SUBJECTIVE AND OBJECTIVE BOX
HPI:   48-year-old male with pmhx of cirrhosis and right knee pain presenting with fvomiting blood.  Patient reports he has been vomiting blood since 9pm yesterday with about 5 episodes total. Patient reports he has been having some R knee pains earlier today and took  ibuprofen which has been taking every other day for one week. He reports a history of cirrhosis which he was hospitalized 7 years ago for drinking. He admits to dizziness and lightheadedness. Also admits to one episode of black stool 2 weeks ago.  He reports he has not drunk alcohol for the past 7 years. . Denies any other recreational drug use. Denies any cp, SOB,  abdominal pain, diarrhea or dysuria.  States he has never vomited blood before or had a blood transfusion.     ED course:  Vitals: temp 97.6 , HR 82, BP 84/58-> 99/62  s/p 2 units pf PRBC , 80 mg of ppi, 2L of nacl, zofran   hemoglobin 6.5 , HCT 21.2  CT angio: Wall thickening along the inferior esophagus. Underlying esophageal tear cannot be excluded. Stomach moderately distended with combination of fluid and blood products. Marked splenomegaly. (25 Sep 2024 01:35)                  PAST MEDICAL & SURGICAL HISTORY:  Cirrhosis          No Known Allergies      Meds:  chlorhexidine 2% Cloths 1 Application(s) Topical <User Schedule>  folic acid Injectable 1 milliGRAM(s) IV Push daily  influenza   Vaccine 0.5 milliLiter(s) IntraMuscular once  octreotide  Infusion 50 MICROgram(s)/Hr IV Continuous <Continuous>  ondansetron Injectable 4 milliGRAM(s) IV Push every 8 hours PRN  pantoprazole  Injectable 40 milliGRAM(s) IV Push two times a day  piperacillin/tazobactam IVPB.. 3.375 Gram(s) IV Intermittent every 8 hours  thiamine IVPB 500 milliGRAM(s) IV Intermittent every 8 hours      SOCIAL HISTORY:  Smoker:  YES / NO        PACK YEARS:                         WHEN QUIT?  ETOH use:  YES / NO               FREQUENCY / QUANTITY:  Ilicit Drug use:  YES / NO  Occupation:  Assisted device use (Cane / Walker):  Live with:    FAMILY HISTORY:      VITALS:  Vital Signs Last 24 Hrs  T(C): 37.8 (26 Sep 2024 17:00), Max: 37.9 (26 Sep 2024 08:00)  T(F): 100 (26 Sep 2024 17:00), Max: 100.2 (26 Sep 2024 08:00)  HR: 71 (26 Sep 2024 17:00) (62 - 82)  BP: 114/70 (26 Sep 2024 17:00) (89/55 - 127/78)  BP(mean): 82 (26 Sep 2024 17:00) (66 - 94)  RR: 14 (26 Sep 2024 17:52) (11 - 24)  SpO2: 97% (26 Sep 2024 17:52) (97% - 100%)    Parameters below as of 26 Sep 2024 17:52  Patient On (Oxygen Delivery Method): room air        LABS/DIAGNOSTIC TESTS:                          7.4    3.87  )-----------( 48       ( 26 Sep 2024 14:20 )             22.6     WBC Count: 3.87 K/uL (09-26 @ 14:20)  WBC Count: 3.54 K/uL (09-26 @ 08:10)  WBC Count: 3.00 K/uL (09-26 @ 03:40)  WBC Count: 3.25 K/uL (09-25 @ 22:15)  WBC Count: 4.10 K/uL (09-25 @ 18:10)  WBC Count: 2.68 K/uL (09-25 @ 10:10)  WBC Count: 4.63 K/uL (09-25 @ 00:50)  WBC Count: 11.62 K/uL (09-24 @ 22:01)      09-26    146[H]  |  116[H]  |  22[H]  ----------------------------<  111[H]  3.6   |  23  |  0.94    Ca    7.5[L]      26 Sep 2024 03:40  Phos  3.6     09-26  Mg     1.9     09-26    TPro  5.3[L]  /  Alb  2.9[L]  /  TBili  0.6  /  DBili  x   /  AST  15  /  ALT  34  /  AlkPhos  71  09-26      Urinalysis Basic - ( 26 Sep 2024 03:40 )    Color: x / Appearance: x / SG: x / pH: x  Gluc: 111 mg/dL / Ketone: x  / Bili: x / Urobili: x   Blood: x / Protein: x / Nitrite: x   Leuk Esterase: x / RBC: x / WBC x   Sq Epi: x / Non Sq Epi: x / Bacteria: x        LIVER FUNCTIONS - ( 26 Sep 2024 03:40 )  Alb: 2.9 g/dL / Pro: 5.3 g/dL / ALK PHOS: 71 U/L / ALT: 34 U/L DA / AST: 15 U/L / GGT: x             PT/INR - ( 26 Sep 2024 03:40 )   PT: 14.5 sec;   INR: 1.25 ratio         PTT - ( 25 Sep 2024 18:10 )  PTT:26.6 sec    LACTATE:    ABG - ABG - ( 26 Sep 2024 03:13 )  pH, Arterial: 7.38  pH, Blood: x     /  pCO2: 40    /  pO2: 136   / HCO3: 24    / Base Excess: -1.3  /  SaO2: 100                 CULTURES:       RADIOLOGY:      ROS  [  ] UNABLE TO ELICIT               HPI:   48-year-old male with pmhx of cirrhosis and right knee pain presenting with vomiting blood.  Patient reports he has been vomiting blood since 9pm yesterday with about 5 episodes total. Patient reports he has been having some R knee pains earlier today and took  ibuprofen which has been taking every other day for one week. He reports a history of cirrhosis which he was hospitalized 7 years ago for drinking. He admits to dizziness and lightheadedness. Also admits to one episode of black stool 2 weeks ago.  He reports he has not drunk alcohol for the past 7 years. . Denies any other recreational drug use. Denies any cp, SOB,  abdominal pain, diarrhea or dysuria.  States he has never vomited blood before or had a blood transfusion.     ED course:  Vitals: temp 97.6 , HR 82, BP 84/58-> 99/62  s/p 2 units pf PRBC , 80 mg of ppi, 2L of nacl, zofran   hemoglobin 6.5 , HCT 21.2  CT angio: Wall thickening along the inferior esophagus. Underlying esophageal tear cannot be excluded. Stomach moderately distended with combination of fluid and blood products. Marked splenomegaly. (25 Sep 2024 01:35)        History as above , asked to see this patient who was an alcoholic that quit 7 yrs ago and has cirrhosis of Liver with ascites and presented with Hematemesis x 1 day , apparently has been using a lot of ibuprofen over the last 1 week. He was found to be hypotensive here and was thought to have aspirated and was initially intubated but has since been extubated. He is in the ICU in no acute distress. He had a low grade fever of 100.2 and is on Zosyn empirically to cover for aspiration Pneumonia and to prevent SBP secondary to Variceal bleeding.            PAST MEDICAL & SURGICAL HISTORY:  Cirrhosis          No Known Allergies      Meds:  chlorhexidine 2% Cloths 1 Application(s) Topical <User Schedule>  folic acid Injectable 1 milliGRAM(s) IV Push daily  influenza   Vaccine 0.5 milliLiter(s) IntraMuscular once  octreotide  Infusion 50 MICROgram(s)/Hr IV Continuous <Continuous>  ondansetron Injectable 4 milliGRAM(s) IV Push every 8 hours PRN  pantoprazole  Injectable 40 milliGRAM(s) IV Push two times a day  piperacillin/tazobactam IVPB.. 3.375 Gram(s) IV Intermittent every 8 hours  thiamine IVPB 500 milliGRAM(s) IV Intermittent every 8 hours      SOCIAL HISTORY:  Smoker:  YES / NO        PACK YEARS:                         WHEN QUIT?  ETOH use:  YES / NO               FREQUENCY / QUANTITY:  Ilicit Drug use:  YES / NO  Occupation:  Assisted device use (Cane / Walker):  Live with:    FAMILY HISTORY:      VITALS:  Vital Signs Last 24 Hrs  T(C): 37.8 (26 Sep 2024 17:00), Max: 37.9 (26 Sep 2024 08:00)  T(F): 100 (26 Sep 2024 17:00), Max: 100.2 (26 Sep 2024 08:00)  HR: 71 (26 Sep 2024 17:00) (62 - 82)  BP: 114/70 (26 Sep 2024 17:00) (89/55 - 127/78)  BP(mean): 82 (26 Sep 2024 17:00) (66 - 94)  RR: 14 (26 Sep 2024 17:52) (11 - 24)  SpO2: 97% (26 Sep 2024 17:52) (97% - 100%)    Parameters below as of 26 Sep 2024 17:52  Patient On (Oxygen Delivery Method): room air        LABS/DIAGNOSTIC TESTS:                          7.4    3.87  )-----------( 48       ( 26 Sep 2024 14:20 )             22.6     WBC Count: 3.87 K/uL (09-26 @ 14:20)  WBC Count: 3.54 K/uL (09-26 @ 08:10)  WBC Count: 3.00 K/uL (09-26 @ 03:40)  WBC Count: 3.25 K/uL (09-25 @ 22:15)  WBC Count: 4.10 K/uL (09-25 @ 18:10)  WBC Count: 2.68 K/uL (09-25 @ 10:10)  WBC Count: 4.63 K/uL (09-25 @ 00:50)  WBC Count: 11.62 K/uL (09-24 @ 22:01)      09-26    146[H]  |  116[H]  |  22[H]  ----------------------------<  111[H]  3.6   |  23  |  0.94    Ca    7.5[L]      26 Sep 2024 03:40  Phos  3.6     09-26  Mg     1.9     09-26    TPro  5.3[L]  /  Alb  2.9[L]  /  TBili  0.6  /  DBili  x   /  AST  15  /  ALT  34  /  AlkPhos  71  09-26      Urinalysis Basic - ( 26 Sep 2024 03:40 )    Color: x / Appearance: x / SG: x / pH: x  Gluc: 111 mg/dL / Ketone: x  / Bili: x / Urobili: x   Blood: x / Protein: x / Nitrite: x   Leuk Esterase: x / RBC: x / WBC x   Sq Epi: x / Non Sq Epi: x / Bacteria: x        LIVER FUNCTIONS - ( 26 Sep 2024 03:40 )  Alb: 2.9 g/dL / Pro: 5.3 g/dL / ALK PHOS: 71 U/L / ALT: 34 U/L DA / AST: 15 U/L / GGT: x             PT/INR - ( 26 Sep 2024 03:40 )   PT: 14.5 sec;   INR: 1.25 ratio         PTT - ( 25 Sep 2024 18:10 )  PTT:26.6 sec    LACTATE:    ABG - ABG - ( 26 Sep 2024 03:13 )  pH, Arterial: 7.38  pH, Blood: x     /  pCO2: 40    /  pO2: 136   / HCO3: 24    / Base Excess: -1.3  /  SaO2: 100                 CULTURES:       RADIOLOGY:      ROS  [  ] UNABLE TO ELICIT               HPI:   48-year-old male with pmhx of cirrhosis and right knee pain presenting with vomiting blood.  Patient reports he has been vomiting blood since 9pm yesterday with about 5 episodes total. Patient reports he has been having some R knee pains earlier today and took  ibuprofen which has been taking every other day for one week. He reports a history of cirrhosis which he was hospitalized 7 years ago for drinking. He admits to dizziness and lightheadedness. Also admits to one episode of black stool 2 weeks ago.  He reports he has not drunk alcohol for the past 7 years. . Denies any other recreational drug use. Denies any cp, SOB,  abdominal pain, diarrhea or dysuria.  States he has never vomited blood before or had a blood transfusion.     ED course:  Vitals: temp 97.6 , HR 82, BP 84/58-> 99/62  s/p 2 units pf PRBC , 80 mg of ppi, 2L of nacl, zofran   hemoglobin 6.5 , HCT 21.2  CT angio: Wall thickening along the inferior esophagus. Underlying esophageal tear cannot be excluded. Stomach moderately distended with combination of fluid and blood products. Marked splenomegaly. (25 Sep 2024 01:35)        History as above , asked to see this patient who was an alcoholic that quit 7 yrs ago and has cirrhosis of Liver with ascites and presented with Hematemesis x 1 day , apparently has been using a lot of ibuprofen over the last 1 week. He was found to be hypotensive here and was thought to have aspirated and was initially intubated but has since been extubated. He is in the ICU in no acute distress. He had a low grade fever of 100.2 and is on Zosyn empirically to cover for aspiration Pneumonia and to prevent SBP secondary to Variceal bleeding.  He is currently awake and answering questions appropriately and is denying any coughing or SOB, no chest pain, no diarrhea or abdominal pain or other symptoms.          PAST MEDICAL & SURGICAL HISTORY:  Cirrhosis          No Known Allergies      Meds:  chlorhexidine 2% Cloths 1 Application(s) Topical <User Schedule>  folic acid Injectable 1 milliGRAM(s) IV Push daily  influenza   Vaccine 0.5 milliLiter(s) IntraMuscular once  octreotide  Infusion 50 MICROgram(s)/Hr IV Continuous <Continuous>  ondansetron Injectable 4 milliGRAM(s) IV Push every 8 hours PRN  pantoprazole  Injectable 40 milliGRAM(s) IV Push two times a day  piperacillin/tazobactam IVPB.. 3.375 Gram(s) IV Intermittent every 8 hours  thiamine IVPB 500 milliGRAM(s) IV Intermittent every 8 hours      SOCIAL HISTORY:  Smoker:  no  ETOH use:  ex alcohol abuser  Ilicit Drug use:  no    FAMILY HISTORY: not contributory      VITALS:  Vital Signs Last 24 Hrs  T(C): 37.8 (26 Sep 2024 17:00), Max: 37.9 (26 Sep 2024 08:00)  T(F): 100 (26 Sep 2024 17:00), Max: 100.2 (26 Sep 2024 08:00)  HR: 71 (26 Sep 2024 17:00) (62 - 82)  BP: 114/70 (26 Sep 2024 17:00) (89/55 - 127/78)  BP(mean): 82 (26 Sep 2024 17:00) (66 - 94)  RR: 14 (26 Sep 2024 17:52) (11 - 24)  SpO2: 97% (26 Sep 2024 17:52) (97% - 100%)    Parameters below as of 26 Sep 2024 17:52  Patient On (Oxygen Delivery Method): room air        LABS/DIAGNOSTIC TESTS:                          7.4    3.87  )-----------( 48       ( 26 Sep 2024 14:20 )             22.6     WBC Count: 3.87 K/uL (09-26 @ 14:20)  WBC Count: 3.54 K/uL (09-26 @ 08:10)  WBC Count: 3.00 K/uL (09-26 @ 03:40)  WBC Count: 3.25 K/uL (09-25 @ 22:15)  WBC Count: 4.10 K/uL (09-25 @ 18:10)  WBC Count: 2.68 K/uL (09-25 @ 10:10)  WBC Count: 4.63 K/uL (09-25 @ 00:50)  WBC Count: 11.62 K/uL (09-24 @ 22:01)      09-26    146[H]  |  116[H]  |  22[H]  ----------------------------<  111[H]  3.6   |  23  |  0.94    Ca    7.5[L]      26 Sep 2024 03:40  Phos  3.6     09-26  Mg     1.9     09-26    TPro  5.3[L]  /  Alb  2.9[L]  /  TBili  0.6  /  DBili  x   /  AST  15  /  ALT  34  /  AlkPhos  71  09-26            LIVER FUNCTIONS - ( 26 Sep 2024 03:40 )  Alb: 2.9 g/dL / Pro: 5.3 g/dL / ALK PHOS: 71 U/L / ALT: 34 U/L DA / AST: 15 U/L / GGT: x             PT/INR - ( 26 Sep 2024 03:40 )   PT: 14.5 sec;   INR: 1.25 ratio         PTT - ( 25 Sep 2024 18:10 )  PTT:26.6 sec    LACTATE:    ABG - ABG - ( 26 Sep 2024 03:13 )  pH, Arterial: 7.38  pH, Blood: x     /  pCO2: 40    /  pO2: 136   / HCO3: 24    / Base Excess: -1.3  /  SaO2: 100                 CULTURES:       RADIOLOGY:< from: CT Angio Abdomen and Pelvis w/ IV Cont (09.24.24 @ 22:23) >  ACC: [id number] EXAM:  CT ANGIO ABD PELV (W)AW IC   ORDERED BY: JONAS REYNOLDS     PROCEDURE DATE:  09/24/2024          INTERPRETATION:  CLINICAL INFORMATION: Hematemesis    COMPARISON: Concurrent CTA chest.    CONTRAST/COMPLICATIONS:  IV Contrast:Omnipaque 350  90 cc administered   10 cc discarded  Oral Contrast: NONE  Complications: None reported at time of study completion    PROCEDURE:  CT Angiography of the Abdomen and Pelvis.  Arterial phase images were acquired.  Sagittal and coronal reformats were performed as well as 3D (MIP)   reconstructions.    FINDINGS:  LOWER CHEST: Lung bases clear.    LIVER: Within normal limits.  BILE DUCTS: Normal caliber.  GALLBLADDER: Cholelithiasis.  SPLEEN: Extensive splenomegaly measuring up to 22 cmin craniocaudal   dimension.  PANCREAS: Within normal limits.  ADRENALS: Within normal limits.  KIDNEYS/URETERS: Within normal limits.    BLADDER: Minimally distended.  REPRODUCTIVE ORGANS: Prostate within normal limits.    BOWEL: Wall thickening along the inferior esophagus. Underlying   esophageal tear cannot be excluded. Stomach moderately distended with   combination of fluid and blood products compatible with history of   hematemesis. No bowel obstruction. Appendix is not visualized. No   evidence of inflammation in the pericecal region.  PERITONEUM/RETROPERITONEUM: Within normal limits.  VESSELS: Aorta unremarkable. Patent origins of the celiac axis, SMA, JANKI   and renal arteries. No stenosis or vascular abnormality appreciated.  LYMPH NODES: No lymphadenopathy.  ABDOMINAL WALL: Within normal limits.  BONES: Within normal limits.    IMPRESSION:  No vascular pathology within the abdomen or pelvis.    Wall thickening along the inferior esophagus. Underlying esophageal tear   cannot beexcluded. If there is clinical concern fluoroscopy could better   evaluate.    Stomach moderately distended with combination of fluid and blood products   compatible with history of hematemesis.    Marked splenomegaly. Laboratory correlation for underlying hematological   process recommended.    --- End of Report ---            DUARTE NUNEZ MD; Attending Radiologist  This document has been electronically signed. Sep 24 2024 11:46PM    < end of copied text >        ROS  [  ] UNABLE TO ELICIT

## 2024-09-26 NOTE — DIETITIAN INITIAL EVALUATION ADULT - PROBLEM SELECTOR PLAN 1
p/w multiple of hematemesis   history of alcohol use, but not in the past 7 years   likely from underlying varices   Vitals: temp 97.6 , HR 82, BP 84/58-> 99/62  s/p 2 units pf PRBC , 80 mg of ppi, 2L of nacl, zofran   hemoglobin 6.5 , HCT 21.2  CT angio: Wall thickening along the inferior esophagus. Underlying esophageal tear cannot be excluded. Stomach moderately distended with combination of fluid and blood products. Marked splenomegaly.  hemoglobin after 2 units 6.7-> another unit-> f/u cbc post transfusion   low platelets-> s/p one unit of PLT   cbc q4-6  admit to tele for closer monitoring   maintain active T&S, 2 large bore peripheral IVs, transfuse for goal Hb >7 or if symptomatic  NPO  PPi BID  c/w iv fluids  c/w zofran prn   -GI consult in AM

## 2024-09-26 NOTE — PROGRESS NOTE ADULT - ASSESSMENT
Assessment:  48-year-old male with pmhx of cirrhosis and right knee pain presenting with hematemesis. Patient was in endoscopy suite getting EGD RRT called for perfuse hematemesis. Patient was intubated. EGD performed, banded. MTP called received 2PRBC and 1FFP Cordis placed, arterial line placed. Patient transferred to ICU for variceal bleed. Patient was spiking a fever this AM, concern for sepsis     Plan:  Neuro:  #s/p intubation on   - SAT/SBT passed - patient extubated on   - on 2.5 L NC    Cardiovascular:  #No issues     Pulmonary:   #AHRF 2/2 GIB, improving  s/p intubated for airway protection on   7.1/69/301/21 --> repeat AB.28/40/136/24  currently on 2.5L NC    Infectious Diseases:  #sepsis     Gastrointestinal:  #Variceal Bleed  #Melena  #Cirrhosis   p/w hematemesis  Hb: 6.5> 1PRBC, 1PLT> 7.2> 2PRBC, 2FFP, 2PLT  s/p EGD w/ banding  c/w octreotide drip  PPI IV BID  MELD Score daily   GI Dr. Camargo     Renal:  #Hypernatremia  2/2 poor oral intake  Trend     Heme/onc:   #Thrombocytopenia   #Anemia 2/2 blood loss  plt 49 2/2 cirrhosis   s/p Hb: 6.5> 1PRBC, 1PLT> 7.2> 2PRBC, 2FFP, 2PLT>   Maintain active type and screen   Transfuse for Hb < 7   Trend CBC q 6     Endo:   #No issues     Skin/ catheter:   #Cordis Right femoral 24  #Peripheral IVs   #Arterial line Right radial 24    Prophylaxis:   #DVT ppx SCD    Goals of Care: Full code     Dispo: ICU    Assessment:  48-year-old male with pmhx of cirrhosis and right knee pain presenting with hematemesis. Patient was in endoscopy suite getting EGD RRT called for perfuse hematemesis. Patient was intubated. EGD performed, banded. MTP called received 2PRBC and 1FFP Cordis placed, arterial line placed. Patient transferred to ICU for variceal bleed. Patient was spiking a fever this AM, concern for sepsis     Plan:  Neuro:  #s/p intubation on   - SAT/SBT passed - patient extubated on   - on 2.5 L NC    Cardiovascular:  #No issues     Pulmonary:   #AHRF 2/2 GIB, improving  - s/p intubated for airway protection on   - 7.1/69/301/21 --> repeat AB.28/40/136/24  - currently on 2.5L NC    Infectious Diseases:  #concern for sepsis   - concern for SBP vs aspiration PNA vs infection from cordis with hx of cirrhosis, profuse bleeding from varices during EGD  - temp: 100.2 at AM on , WBC: 3.54  - f/u BCx, sputum Cx  - c/w zosyn 3.375g q8h ( - )  - consulted ID Dr. King    Gastrointestinal:  #Variceal Bleed  #Melena  #Cirrhosis 2/2 alcohol  p/w hematemesis, was taking ibuprofen every other day for 1 week before ED admission for right knee pain  CT angio A/P: wall thickening along inferior esophagus, underlying esophageal tear cannot be excluded, marked splenomegaly, stomach distended with fluid and blood products  EGD: esophagus - bright red blood, active arterial bleed visualized at distal esophagus   Initial Hb: 6.5> 1PRBC, 1PLT> 7.2> 2PRBC, 2FFP, 2PLT > 6.7 > 1PRBC > 7.1 > 7.2  MELD score: 9 on   s/p EGD w/ banding x4 on   c/w octreotide drip for 72 h ( - )  c/w PPI IV BID, thiamine and folic acid  f/u MELD Score daily   GI Dr. Camargo following    Renal:  #Hypernatremia  2/2 poor oral intake  monitor     Heme/onc:   #Thrombocytopenia, leukopenia   #Anemia 2/2 blood loss  2/2 cirrhosis   s/p 4PRBC, 3 PLT, 2 FFP   Maintain active type and screen   Transfuse for Hb < 7   Trend CBC q 6     Endo:   #No issues     Skin/ catheter:   #Peripheral IVs   #Arterial line Right radial 24  Greene catheter    Prophylaxis:   #DVT ppx SCD    Goals of Care: Full code     Dispo: ICU

## 2024-09-26 NOTE — CONSULT NOTE ADULT - GASTROINTESTINAL
details… soft/nontender/normal active bowel sounds/no guarding/no rigidity/no organomegaly/no masses palpable

## 2024-09-26 NOTE — DIETITIAN INITIAL EVALUATION ADULT - PERTINENT LABORATORY DATA
09-26    146[H]  |  116[H]  |  22[H]  ----------------------------<  111[H]  3.6   |  23  |  0.94    Ca    7.5[L]      26 Sep 2024 03:40  Phos  3.6     09-26  Mg     1.9     09-26    TPro  5.3[L]  /  Alb  2.9[L]  /  TBili  0.6  /  DBili  x   /  AST  15  /  ALT  34  /  AlkPhos  71  09-26  POCT Blood Glucose.: 119 mg/dL (09-26-24 @ 06:15)

## 2024-09-26 NOTE — DIETITIAN INITIAL EVALUATION ADULT - FACTORS AFF FOOD INTAKE
s/p intubation, altered GI function of GI bleeding, h/o cirrhosis/change in mental status/difficulty swallowing/NPO

## 2024-09-26 NOTE — PROGRESS NOTE ADULT - SUBJECTIVE AND OBJECTIVE BOX
Patient is a 48y old  Male who presents with a chief complaint of GI bleed (26 Sep 2024 11:30)      OVERNIGHT EVENTS:   Patient received 1 Uprbc overnight.     SUBJECTIVE/INTERVAL HPI: Patient seen and examined at bedside. Lemir [id number]  was used. Patient is able to follow commands while completing SAT. Patient temperature was 100.2 this morning around 8am.    PRESSORS: [ ] YES [ ] NO  WHICH:    ICU Vital Signs Last 24 Hrs  T(C): 37.6 (26 Sep 2024 10:34), Max: 37.9 (26 Sep 2024 08:00)  T(F): 99.7 (26 Sep 2024 10:34), Max: 100.2 (26 Sep 2024 08:00)  HR: 74 (26 Sep 2024 10:34) (60 - 89)  BP: 118/68 (26 Sep 2024 10:34) (89/55 - 127/78)  BP(mean): 83 (26 Sep 2024 10:34) (66 - 98)  ABP: 133/74 (26 Sep 2024 10:34) (92/48 - 163/110)  ABP(mean): 95 (26 Sep 2024 10:34) (63 - 129)  RR: 11 (26 Sep 2024 10:34) (11 - 25)  SpO2: 99% (26 Sep 2024 10:34) (99% - 100%)    O2 Parameters below as of 26 Sep 2024 10:34  Patient On (Oxygen Delivery Method): nasal cannula  O2 Flow (L/min): 3        I&O's Summary    25 Sep 2024 07:01  -  26 Sep 2024 07:00  --------------------------------------------------------  IN: 1968.9 mL / OUT: 2800 mL / NET: -831.1 mL    26 Sep 2024 07:01  -  26 Sep 2024 11:42  --------------------------------------------------------  IN: 36.7 mL / OUT: 0 mL / NET: 36.7 mL      Mode: CPAP with PS  FiO2: 40  PEEP: 5  PS: 10  ITime: 1  MAP: 7  PIP: 11      PHYSICAL EXAM:  GENERAL: No acute distress, intubated   HEAD:  Atraumatic, Normocephalic  EYES: EOMI, PERRLA, conjunctiva and sclera clear  ENMT: Moist mucous membranes  NECK: Supple, No JVD  HEART: Regular rate and rhythm; No murmurs, rubs, or gallops  RESPIRATORY: clear to ascultation  ABDOMEN: Soft, Nontender, minimal abdominal distention; Bowel sounds present  NEUROLOGY: moving all extremities, able to follow vocal commands  EXTREMITIES:  2+ Peripheral Pulses, No LE edema  SKIN: warm, dry, normal color, no rash or abnormal lesions    LABS:                        7.2    3.54  )-----------( 53       ( 26 Sep 2024 08:10 )             21.5     09-26    146[H]  |  116[H]  |  22[H]  ----------------------------<  111[H]  3.6   |  23  |  0.94    Ca    7.5[L]      26 Sep 2024 03:40  Phos  3.6     09-26  Mg     1.9     09-26    TPro  5.3[L]  /  Alb  2.9[L]  /  TBili  0.6  /  DBili  x   /  AST  15  /  ALT  34  /  AlkPhos  71  09-26    PT/INR - ( 26 Sep 2024 03:40 )   PT: 14.5 sec;   INR: 1.25 ratio         PTT - ( 25 Sep 2024 18:10 )  PTT:26.6 sec  Urinalysis Basic - ( 26 Sep 2024 03:40 )    Color: x / Appearance: x / SG: x / pH: x  Gluc: 111 mg/dL / Ketone: x  / Bili: x / Urobili: x   Blood: x / Protein: x / Nitrite: x   Leuk Esterase: x / RBC: x / WBC x   Sq Epi: x / Non Sq Epi: x / Bacteria: x      CAPILLARY BLOOD GLUCOSE      POCT Blood Glucose.: 119 mg/dL (26 Sep 2024 06:15)    ABG - ( 26 Sep 2024 03:13 )  pH, Arterial: 7.38  pH, Blood: x     /  pCO2: 40    /  pO2: 136   / HCO3: 24    / Base Excess: -1.3  /  SaO2: 100                 Consultant(s) Notes Reviewed:  [x ] YES  [ ] NO    MEDICATIONS  (STANDING):  chlorhexidine 2% Cloths 1 Application(s) Topical <User Schedule>  folic acid Injectable 1 milliGRAM(s) IV Push daily  influenza   Vaccine 0.5 milliLiter(s) IntraMuscular once  octreotide  Infusion 50 MICROgram(s)/Hr (10 mL/Hr) IV Continuous <Continuous>  pantoprazole  Injectable 40 milliGRAM(s) IV Push two times a day  piperacillin/tazobactam IVPB.. 3.375 Gram(s) IV Intermittent every 8 hours  thiamine IVPB 500 milliGRAM(s) IV Intermittent every 8 hours    MEDICATIONS  (PRN):  ondansetron Injectable 4 milliGRAM(s) IV Push every 8 hours PRN Nausea and/or Vomiting  sodium chloride 0.9% lock flush 10 milliLiter(s) IV Push every 1 hour PRN Pre/post blood products, medications, blood draw, and to maintain line patency      Care Discussed with Consultants/Other Providers [ x] YES  [ ] NO    RADIOLOGY & ADDITIONAL TESTS:    < from: EGD (09.25.24 @ 16:00) >  Esophagus: bright red blood. Distal esophagus reached, active arterial bleed    visualized -varix with nipple sign, squirting, Scope withdrawn, banding device    applied - initial one misfired, new one placed. Scope initially clogged by clot,    eventually clot cleared with brush and then 4 bands applied. Hemostasis    achieved. .        Plan:        Return to floor for further management> Octerotide gtt 72 h. PPi gtt. NPO. Agree    with ICU plan to keep sedated/intubated for now.      < end of copied text >   Patient is a 48y old  Male who presents with a chief complaint of GI bleed (26 Sep 2024 11:30)      OVERNIGHT EVENTS:   Patient received 1 Uprbc overnight. No BM overnight    SUBJECTIVE/INTERVAL HPI: Patient seen and examined at bedside. Lemir [id number]  was used. Patient is able to follow commands while completing SAT. Patient temperature was 100.2 this morning around 8am.    PRESSORS: [ ] YES [ ] NO  WHICH:    ICU Vital Signs Last 24 Hrs  T(C): 37.6 (26 Sep 2024 10:34), Max: 37.9 (26 Sep 2024 08:00)  T(F): 99.7 (26 Sep 2024 10:34), Max: 100.2 (26 Sep 2024 08:00)  HR: 74 (26 Sep 2024 10:34) (60 - 89)  BP: 118/68 (26 Sep 2024 10:34) (89/55 - 127/78)  BP(mean): 83 (26 Sep 2024 10:34) (66 - 98)  ABP: 133/74 (26 Sep 2024 10:34) (92/48 - 163/110)  ABP(mean): 95 (26 Sep 2024 10:34) (63 - 129)  RR: 11 (26 Sep 2024 10:34) (11 - 25)  SpO2: 99% (26 Sep 2024 10:34) (99% - 100%)    O2 Parameters below as of 26 Sep 2024 10:34  Patient On (Oxygen Delivery Method): nasal cannula  O2 Flow (L/min): 3        I&O's Summary    25 Sep 2024 07:01  -  26 Sep 2024 07:00  --------------------------------------------------------  IN: 1968.9 mL / OUT: 2800 mL / NET: -831.1 mL    26 Sep 2024 07:01  -  26 Sep 2024 11:42  --------------------------------------------------------  IN: 36.7 mL / OUT: 0 mL / NET: 36.7 mL      Mode: CPAP with PS  FiO2: 40  PEEP: 5  PS: 10  ITime: 1  MAP: 7  PIP: 11      PHYSICAL EXAM:  GENERAL: No acute distress, intubated, diaphoretic   HEAD:  Atraumatic, Normocephalic  EYES: EOMI, PERRLA, conjunctiva and sclera clear  ENMT: Moist mucous membranes  NECK: Supple, No JVD  HEART: Regular rate and rhythm; No murmurs, rubs, or gallops  RESPIRATORY: clear to ascultation  ABDOMEN: Soft, Nontender, minimal abdominal distention; Bowel sounds present  NEUROLOGY: moving all extremities, able to follow vocal commands  EXTREMITIES:  2+ Peripheral Pulses, No LE edema  SKIN: warm, dry, normal color, no rash or abnormal lesions    LABS:                        7.2    3.54  )-----------( 53       ( 26 Sep 2024 08:10 )             21.5     09-26    146[H]  |  116[H]  |  22[H]  ----------------------------<  111[H]  3.6   |  23  |  0.94    Ca    7.5[L]      26 Sep 2024 03:40  Phos  3.6     09-26  Mg     1.9     09-26    TPro  5.3[L]  /  Alb  2.9[L]  /  TBili  0.6  /  DBili  x   /  AST  15  /  ALT  34  /  AlkPhos  71  09-26    PT/INR - ( 26 Sep 2024 03:40 )   PT: 14.5 sec;   INR: 1.25 ratio         PTT - ( 25 Sep 2024 18:10 )  PTT:26.6 sec  Urinalysis Basic - ( 26 Sep 2024 03:40 )    Color: x / Appearance: x / SG: x / pH: x  Gluc: 111 mg/dL / Ketone: x  / Bili: x / Urobili: x   Blood: x / Protein: x / Nitrite: x   Leuk Esterase: x / RBC: x / WBC x   Sq Epi: x / Non Sq Epi: x / Bacteria: x      CAPILLARY BLOOD GLUCOSE      POCT Blood Glucose.: 119 mg/dL (26 Sep 2024 06:15)    ABG - ( 26 Sep 2024 03:13 )  pH, Arterial: 7.38  pH, Blood: x     /  pCO2: 40    /  pO2: 136   / HCO3: 24    / Base Excess: -1.3  /  SaO2: 100                 Consultant(s) Notes Reviewed:  [x ] YES  [ ] NO    MEDICATIONS  (STANDING):  chlorhexidine 2% Cloths 1 Application(s) Topical <User Schedule>  folic acid Injectable 1 milliGRAM(s) IV Push daily  influenza   Vaccine 0.5 milliLiter(s) IntraMuscular once  octreotide  Infusion 50 MICROgram(s)/Hr (10 mL/Hr) IV Continuous <Continuous>  pantoprazole  Injectable 40 milliGRAM(s) IV Push two times a day  piperacillin/tazobactam IVPB.. 3.375 Gram(s) IV Intermittent every 8 hours  thiamine IVPB 500 milliGRAM(s) IV Intermittent every 8 hours    MEDICATIONS  (PRN):  ondansetron Injectable 4 milliGRAM(s) IV Push every 8 hours PRN Nausea and/or Vomiting  sodium chloride 0.9% lock flush 10 milliLiter(s) IV Push every 1 hour PRN Pre/post blood products, medications, blood draw, and to maintain line patency      Care Discussed with Consultants/Other Providers [ x] YES  [ ] NO    RADIOLOGY & ADDITIONAL TESTS:    < from: EGD (09.25.24 @ 16:00) >  Esophagus: bright red blood. Distal esophagus reached, active arterial bleed    visualized -varix with nipple sign, squirting, Scope withdrawn, banding device    applied - initial one misfired, new one placed. Scope initially clogged by clot,    eventually clot cleared with brush and then 4 bands applied. Hemostasis    achieved. .        Plan:        Return to floor for further management> Octerotide gtt 72 h. PPi gtt. NPO. Agree    with ICU plan to keep sedated/intubated for now.      < end of copied text >    < from: CT Angio Abdomen and Pelvis w/ IV Cont (09.24.24 @ 22:23) >  No vascular pathology within the abdomen or pelvis.    Wall thickening along the inferior esophagus. Underlying esophageal tear   cannot beexcluded. If there is clinical concern fluoroscopy could better   evaluate.    Stomach moderately distended with combination of fluid and blood products   compatible with history of hematemesis.    Marked splenomegaly. Laboratory correlation for underlying hematological   process recommended.    < end of copied text >

## 2024-09-26 NOTE — PROGRESS NOTE ADULT - ASSESSMENT
49 yo M PMH cirrhosis presented to ED with several bouts of hematemesis and Hgb 6.5. CTAP showed inferior esophageal wall thickening.  49 yo M PMH cirrhosis presented to ED with several bouts of hematemesis and Hgb 6.5. CTAP showed inferior esophageal wall thickening. RRT called during EGD for worsening hematemesis requiring intubation and urgent variceal banding. Pt was upgraded to ICU for closer monitoring and is now extubated with removal of femoral line.    #Variceal Hemorrhage  #Alcoholic Cirrhosis    CT angio A/P: wall thickening along inferior esophagus, underlying esophageal tear cannot be excluded, marked splenomegaly, stomach distended with fluid and blood products  EGD 9/25 significant for active variceal bleeds requiring 4 bands  Hb: 6.5> 1PRBC, 1PLT> 7.2> 2PRBC, 2FFP, 2PLT > 6.7 > 1PRBC > 7.1 > 7.2 >7.4  cont octreotide gtt 3-5 days (Day 2)  maintain NPO for now, IVF maintenance  monitor CBC  if pt continues to have bleeding episodes, may have to rescope band integrity / rule out tear or ulcerations  c/w PPI IV BID  Avoid NG tube iso recent variceal bleed 49 yo M PMH cirrhosis presented to ED with several bouts of hematemesis and Hgb 6.5. CTAP showed inferior esophageal wall thickening. RRT called during EGD for worsening hematemesis requiring intubation and urgent variceal banding. Pt was upgraded to ICU for closer monitoring and is now extubated with removal of femoral line.    #Variceal Hemorrhage  #Alcoholic Cirrhosis    CT angio A/P: wall thickening along inferior esophagus, underlying esophageal tear cannot be excluded, marked splenomegaly, stomach distended with fluid and blood products  EGD 9/25 significant for active variceal bleeds requiring 4 bands  Hb: 6.5> 1PRBC, 1PLT> 7.2> 2PRBC, 2FFP, 2PLT > 6.7 > 1PRBC > 7.1 > 7.2 >7.4  cont octreotide gtt 3-5 days (Day 2)  maintain NPO for now, IVF maintenance  monitor CBC  LFTs wnl  if pt continues to have bleeding episodes, may have to rescope band integrity / rule out tear or ulcerations  c/w PPI IV BID  Avoid NG tube iso recent variceal bleed

## 2024-09-26 NOTE — DIETITIAN INITIAL EVALUATION ADULT - OTHER INFO
Pt lives home PTA, s/p RRT 9/25/24 for Airway concerns, Uncontrolled bleeding, intubated, transferred to ICU, sedated on Vent support, on Propofol 14.6 ml/hr (if 24hr, 350ml or 385kcal); NPO x 1-2d in-house, limited intake/wt change history data available at present

## 2024-09-26 NOTE — DIETITIAN INITIAL EVALUATION ADULT - PERTINENT MEDS FT
MEDICATIONS  (STANDING):  chlorhexidine 0.12% Liquid 15 milliLiter(s) Oral Mucosa every 12 hours  chlorhexidine 2% Cloths 1 Application(s) Topical <User Schedule>  fentaNYL   Infusion 1.5 MICROgram(s)/kG/Hr (12.1 mL/Hr) IV Continuous <Continuous>  folic acid Injectable 1 milliGRAM(s) IV Push daily  influenza   Vaccine 0.5 milliLiter(s) IntraMuscular once  octreotide  Infusion 50 MICROgram(s)/Hr (10 mL/Hr) IV Continuous <Continuous>  pantoprazole  Injectable 40 milliGRAM(s) IV Push two times a day  piperacillin/tazobactam IVPB.. 3.375 Gram(s) IV Intermittent every 8 hours  propofol Infusion 30 MICROgram(s)/kG/Min (13.8 mL/Hr) IV Continuous <Continuous>  thiamine IVPB 500 milliGRAM(s) IV Intermittent every 8 hours    MEDICATIONS  (PRN):  fentaNYL    Injectable 25 MICROGram(s) IV Push every 4 hours PRN Distress  ondansetron Injectable 4 milliGRAM(s) IV Push every 8 hours PRN Nausea and/or Vomiting  sodium chloride 0.9% lock flush 10 milliLiter(s) IV Push every 1 hour PRN Pre/post blood products, medications, blood draw, and to maintain line patency

## 2024-09-26 NOTE — PROGRESS NOTE ADULT - SUBJECTIVE AND OBJECTIVE BOX
PGY-1 Progress Note discussed with attending      PLEASE CONTACT ON CALL TEAM:  - On Call Team (Please refer to Carlos) FROM 5:00 PM - 8:30PM  - Nightfloat Team FROM 8:30 -7:30 AM    INTERVAL HPI/OVERNIGHT EVENTS:     Patient acutely decompensated during EGD on 9/25 requiring RRT and urgent variceal banding, intubation, and 2uprbc + 1uplatete in Endoscopy Suite. Patient was admitted to ICU. Examined patient this morning at bedside, still intubated and diaphoretic. Patient nodded in response to questioning, denying any pain. S/p 1uprbc overnight, mild fever to 100.2, and no reported BM.    Addendum: patient extubated and right femoral line removed later in the day.    _________________________________________________  Rest of ROS neg unless otherwise mentioned in HPI    MEDICATIONS  (STANDING):  chlorhexidine 2% Cloths 1 Application(s) Topical <User Schedule>  folic acid Injectable 1 milliGRAM(s) IV Push daily  influenza   Vaccine 0.5 milliLiter(s) IntraMuscular once  octreotide  Infusion 50 MICROgram(s)/Hr (10 mL/Hr) IV Continuous <Continuous>  pantoprazole  Injectable 40 milliGRAM(s) IV Push two times a day  piperacillin/tazobactam IVPB.. 3.375 Gram(s) IV Intermittent every 8 hours  thiamine IVPB 500 milliGRAM(s) IV Intermittent every 8 hours    MEDICATIONS  (PRN):  ondansetron Injectable 4 milliGRAM(s) IV Push every 8 hours PRN Nausea and/or Vomiting      Vital Signs Last 24 Hrs  T(C): 37.7 (26 Sep 2024 13:00), Max: 37.9 (26 Sep 2024 08:00)  T(F): 99.9 (26 Sep 2024 13:00), Max: 100.2 (26 Sep 2024 08:00)  HR: 66 (26 Sep 2024 13:00) (62 - 89)  BP: 114/69 (26 Sep 2024 13:00) (89/55 - 127/78)  BP(mean): 81 (26 Sep 2024 13:00) (66 - 98)  RR: 15 (26 Sep 2024 13:00) (11 - 25)  SpO2: 99% (26 Sep 2024 13:00) (99% - 100%)    Parameters below as of 26 Sep 2024 13:00  Patient On (Oxygen Delivery Method): nasal cannula  O2 Flow (L/min): 3    _________________________________________________  PHYSICAL EXAMINATION:  GENERAL: NAD  HEAD:  Atraumatic, Normocephalic, diaphoretic  EYES:  conjunctiva and sclera clear  NECK: Supple, No JVD, Normal thyroid  CHEST/LUNG: Clear to auscultation.  HEART: Regular rate and rhythm; No murmurs, rubs, or gallops  ABDOMEN: Mildly distended, Soft, Nontender, Bowel sounds present, no masses on palpation  NERVOUS SYSTEM:  Alert and oriented  EXTREMITIES:  No BLE edema  SKIN: warm, dry    I&O's Summary    25 Sep 2024 07:01  -  26 Sep 2024 07:00  --------------------------------------------------------  IN: 1968.9 mL / OUT: 2800 mL / NET: -831.1 mL    26 Sep 2024 07:01  -  26 Sep 2024 13:33  --------------------------------------------------------  IN: 36.7 mL / OUT: 400 mL / NET: -363.3 mL      _________________________________________________  LABS:                        7.2    3.54  )-----------( 53       ( 26 Sep 2024 08:10 )             21.5     09-26    146[H]  |  116[H]  |  22[H]  ----------------------------<  111[H]  3.6   |  23  |  0.94    Ca    7.5[L]      26 Sep 2024 03:40  Phos  3.6     09-26  Mg     1.9     09-26    TPro  5.3[L]  /  Alb  2.9[L]  /  TBili  0.6  /  DBili  x   /  AST  15  /  ALT  34  /  AlkPhos  71  09-26    LIVER FUNCTIONS - ( 26 Sep 2024 03:40 )  Alb: 2.9 g/dL / Pro: 5.3 g/dL / ALK PHOS: 71 U/L / ALT: 34 U/L DA / AST: 15 U/L / GGT: x           CAPILLARY BLOOD GLUCOSE      POCT Blood Glucose.: 119 mg/dL (26 Sep 2024 06:15)        PT/INR - ( 26 Sep 2024 03:40 )   PT: 14.5 sec;   INR: 1.25 ratio         PTT - ( 25 Sep 2024 18:10 )  PTT:26.6 sec      Urinalysis Basic - ( 26 Sep 2024 03:40 )    Color: x / Appearance: x / SG: x / pH: x  Gluc: 111 mg/dL / Ketone: x  / Bili: x / Urobili: x   Blood: x / Protein: x / Nitrite: x   Leuk Esterase: x / RBC: x / WBC x   Sq Epi: x / Non Sq Epi: x / Bacteria: x      _________________________________________________  RADIOLOGY & ADDITIONAL TESTS:    EGD 9/25: Esophagus: bright red blood. Distal esophagus reached, active arterial bleed visualized -varix with nipple sign, squirting, Scope withdrawn, banding device applied - initial one misfired, new one placed. Scope initially clogged by clot, eventually clot cleared with brush and then 4 bands applied. Hemostasis achieved.     Imaging Personally Reviewed:  YES    Consultant(s) Notes Reviewed:   YES    Care Discussed with Consultants : YES     Plan of care was discussed with patient and /or primary care giver; all questions and concerns were addressed and care was aligned with patient's wishes.       PGY-1 Progress Note discussed with attending      PLEASE CONTACT ON CALL TEAM:  - On Call Team (Please refer to Carlos) FROM 5:00 PM - 8:30PM  - Nightfloat Team FROM 8:30 -7:30 AM    INTERVAL HPI/OVERNIGHT EVENTS:     Patient acutely decompensated during EGD on 9/25 requiring RRT and urgent variceal banding, intubation, and 2uprbc + 1uplatete in Endoscopy Suite. Patient was admitted to ICU. Examined patient this morning at bedside, still intubated and diaphoretic. Patient nodded in response to questioning, denying any pain. S/p 1uprbc overnight, mild fever to 100.2, and no reported BM.    Addendum: patient extubated and right femoral line removed later in the day.    _________________________________________________  Rest of ROS neg unless otherwise mentioned in HPI    MEDICATIONS  (STANDING):  chlorhexidine 2% Cloths 1 Application(s) Topical <User Schedule>  folic acid Injectable 1 milliGRAM(s) IV Push daily  influenza   Vaccine 0.5 milliLiter(s) IntraMuscular once  octreotide  Infusion 50 MICROgram(s)/Hr (10 mL/Hr) IV Continuous <Continuous>  pantoprazole  Injectable 40 milliGRAM(s) IV Push two times a day  piperacillin/tazobactam IVPB.. 3.375 Gram(s) IV Intermittent every 8 hours  thiamine IVPB 500 milliGRAM(s) IV Intermittent every 8 hours    MEDICATIONS  (PRN):  ondansetron Injectable 4 milliGRAM(s) IV Push every 8 hours PRN Nausea and/or Vomiting      Vital Signs Last 24 Hrs  T(C): 37.7 (26 Sep 2024 13:00), Max: 37.9 (26 Sep 2024 08:00)  T(F): 99.9 (26 Sep 2024 13:00), Max: 100.2 (26 Sep 2024 08:00)  HR: 66 (26 Sep 2024 13:00) (62 - 89)  BP: 114/69 (26 Sep 2024 13:00) (89/55 - 127/78)  BP(mean): 81 (26 Sep 2024 13:00) (66 - 98)  RR: 15 (26 Sep 2024 13:00) (11 - 25)  SpO2: 99% (26 Sep 2024 13:00) (99% - 100%)    Parameters below as of 26 Sep 2024 13:00  Patient On (Oxygen Delivery Method): nasal cannula  O2 Flow (L/min): 3    _________________________________________________  PHYSICAL EXAMINATION:  GENERAL: Fatigued  HEAD:  Atraumatic, Normocephalic, diaphoretic  EYES:  conjunctiva and sclera clear  NECK: Supple, No JVD, Normal thyroid  CHEST/LUNG: Clear to auscultation.  HEART: Regular rate and rhythm; No murmurs, rubs, or gallops  ABDOMEN: Mildly distended, Soft, Nontender, Bowel sounds present, no masses on palpation  NERVOUS SYSTEM:  Alert and oriented  EXTREMITIES:  No BLE edema  SKIN: warm, dry    I&O's Summary    25 Sep 2024 07:01  -  26 Sep 2024 07:00  --------------------------------------------------------  IN: 1968.9 mL / OUT: 2800 mL / NET: -831.1 mL    26 Sep 2024 07:01  -  26 Sep 2024 13:33  --------------------------------------------------------  IN: 36.7 mL / OUT: 400 mL / NET: -363.3 mL      _________________________________________________  LABS:                        7.2    3.54  )-----------( 53       ( 26 Sep 2024 08:10 )             21.5     09-26    146[H]  |  116[H]  |  22[H]  ----------------------------<  111[H]  3.6   |  23  |  0.94    Ca    7.5[L]      26 Sep 2024 03:40  Phos  3.6     09-26  Mg     1.9     09-26    TPro  5.3[L]  /  Alb  2.9[L]  /  TBili  0.6  /  DBili  x   /  AST  15  /  ALT  34  /  AlkPhos  71  09-26    LIVER FUNCTIONS - ( 26 Sep 2024 03:40 )  Alb: 2.9 g/dL / Pro: 5.3 g/dL / ALK PHOS: 71 U/L / ALT: 34 U/L DA / AST: 15 U/L / GGT: x           CAPILLARY BLOOD GLUCOSE      POCT Blood Glucose.: 119 mg/dL (26 Sep 2024 06:15)        PT/INR - ( 26 Sep 2024 03:40 )   PT: 14.5 sec;   INR: 1.25 ratio         PTT - ( 25 Sep 2024 18:10 )  PTT:26.6 sec      Urinalysis Basic - ( 26 Sep 2024 03:40 )    Color: x / Appearance: x / SG: x / pH: x  Gluc: 111 mg/dL / Ketone: x  / Bili: x / Urobili: x   Blood: x / Protein: x / Nitrite: x   Leuk Esterase: x / RBC: x / WBC x   Sq Epi: x / Non Sq Epi: x / Bacteria: x      _________________________________________________  RADIOLOGY & ADDITIONAL TESTS:    EGD 9/25: Esophagus: bright red blood. Distal esophagus reached, active arterial bleed visualized -varix with nipple sign, squirting, Scope withdrawn, banding device applied - initial one misfired, new one placed. Scope initially clogged by clot, eventually clot cleared with brush and then 4 bands applied. Hemostasis achieved.     Imaging Personally Reviewed:  YES    Consultant(s) Notes Reviewed:   YES    Care Discussed with Consultants : YES     Plan of care was discussed with patient and /or primary care giver; all questions and concerns were addressed and care was aligned with patient's wishes.

## 2024-09-26 NOTE — CHART NOTE - NSCHARTNOTEFT_GEN_A_CORE
Patient seen and examined at bedside for removal of right femoral cordis line. Chart and labs reviewed prior to removal, no contraindication to procedure. Area cleaned with chlorhexidine swabs and suture removal kit used to remove sutures holding central line in place using aseptic technique.  Patient then placed in reverse Trendelenburg position, asked to hold breath/hum, and then  cordis line removed. Pressure applied for 30 minutes until no bleeding was noted. No hematoma formation noted. Tegaderm and gauze/tape used to create pressure dressing to maintain pressure on central line site. Patient tolerated well without complications.   Discussed procedure with RN who will monitor and notify the provider for bleeding, hematoma formation, or other procedural complications.

## 2024-09-27 DIAGNOSIS — K70.30 ALCOHOLIC CIRRHOSIS OF LIVER WITHOUT ASCITES: ICD-10-CM

## 2024-09-27 LAB
ALBUMIN SERPL ELPH-MCNC: 2.9 G/DL — LOW (ref 3.5–5)
ALP SERPL-CCNC: 71 U/L — SIGNIFICANT CHANGE UP (ref 40–120)
ALT FLD-CCNC: 30 U/L DA — SIGNIFICANT CHANGE UP (ref 10–60)
ANION GAP SERPL CALC-SCNC: 8 MMOL/L — SIGNIFICANT CHANGE UP (ref 5–17)
AST SERPL-CCNC: 14 U/L — SIGNIFICANT CHANGE UP (ref 10–40)
BASOPHILS # BLD AUTO: 0.01 K/UL — SIGNIFICANT CHANGE UP (ref 0–0.2)
BASOPHILS # BLD AUTO: 0.01 K/UL — SIGNIFICANT CHANGE UP (ref 0–0.2)
BASOPHILS NFR BLD AUTO: 0.2 % — SIGNIFICANT CHANGE UP (ref 0–2)
BASOPHILS NFR BLD AUTO: 0.3 % — SIGNIFICANT CHANGE UP (ref 0–2)
BILIRUB SERPL-MCNC: 0.9 MG/DL — SIGNIFICANT CHANGE UP (ref 0.2–1.2)
BUN SERPL-MCNC: 15 MG/DL — SIGNIFICANT CHANGE UP (ref 7–18)
CALCIUM SERPL-MCNC: 8.2 MG/DL — LOW (ref 8.4–10.5)
CHLORIDE SERPL-SCNC: 112 MMOL/L — HIGH (ref 96–108)
CO2 SERPL-SCNC: 21 MMOL/L — LOW (ref 22–31)
CREAT SERPL-MCNC: 0.89 MG/DL — SIGNIFICANT CHANGE UP (ref 0.5–1.3)
CULTURE RESULTS: SIGNIFICANT CHANGE UP
EGFR: 106 ML/MIN/1.73M2 — SIGNIFICANT CHANGE UP
EOSINOPHIL # BLD AUTO: 0.13 K/UL — SIGNIFICANT CHANGE UP (ref 0–0.5)
EOSINOPHIL # BLD AUTO: 0.18 K/UL — SIGNIFICANT CHANGE UP (ref 0–0.5)
EOSINOPHIL NFR BLD AUTO: 4.1 % — SIGNIFICANT CHANGE UP (ref 0–6)
EOSINOPHIL NFR BLD AUTO: 4.2 % — SIGNIFICANT CHANGE UP (ref 0–6)
GLUCOSE BLDC GLUCOMTR-MCNC: 130 MG/DL — HIGH (ref 70–99)
GLUCOSE SERPL-MCNC: 97 MG/DL — SIGNIFICANT CHANGE UP (ref 70–99)
HCT VFR BLD CALC: 23 % — LOW (ref 39–50)
HCT VFR BLD CALC: 26.7 % — LOW (ref 39–50)
HGB BLD-MCNC: 7.6 G/DL — LOW (ref 13–17)
HGB BLD-MCNC: 8.8 G/DL — LOW (ref 13–17)
IMM GRANULOCYTES NFR BLD AUTO: 0.2 % — SIGNIFICANT CHANGE UP (ref 0–0.9)
IMM GRANULOCYTES NFR BLD AUTO: 0.3 % — SIGNIFICANT CHANGE UP (ref 0–0.9)
LYMPHOCYTES # BLD AUTO: 0.5 K/UL — LOW (ref 1–3.3)
LYMPHOCYTES # BLD AUTO: 0.54 K/UL — LOW (ref 1–3.3)
LYMPHOCYTES # BLD AUTO: 12.7 % — LOW (ref 13–44)
LYMPHOCYTES # BLD AUTO: 15.6 % — SIGNIFICANT CHANGE UP (ref 13–44)
MAGNESIUM SERPL-MCNC: 1.6 MG/DL — SIGNIFICANT CHANGE UP (ref 1.6–2.6)
MCHC RBC-ENTMCNC: 27.1 PG — SIGNIFICANT CHANGE UP (ref 27–34)
MCHC RBC-ENTMCNC: 27.2 PG — SIGNIFICANT CHANGE UP (ref 27–34)
MCHC RBC-ENTMCNC: 33 GM/DL — SIGNIFICANT CHANGE UP (ref 32–36)
MCHC RBC-ENTMCNC: 33 GM/DL — SIGNIFICANT CHANGE UP (ref 32–36)
MCV RBC AUTO: 82.1 FL — SIGNIFICANT CHANGE UP (ref 80–100)
MCV RBC AUTO: 82.4 FL — SIGNIFICANT CHANGE UP (ref 80–100)
MONOCYTES # BLD AUTO: 0.26 K/UL — SIGNIFICANT CHANGE UP (ref 0–0.9)
MONOCYTES # BLD AUTO: 0.4 K/UL — SIGNIFICANT CHANGE UP (ref 0–0.9)
MONOCYTES NFR BLD AUTO: 8.1 % — SIGNIFICANT CHANGE UP (ref 2–14)
MONOCYTES NFR BLD AUTO: 9.4 % — SIGNIFICANT CHANGE UP (ref 2–14)
NEUTROPHILS # BLD AUTO: 2.29 K/UL — SIGNIFICANT CHANGE UP (ref 1.8–7.4)
NEUTROPHILS # BLD AUTO: 3.1 K/UL — SIGNIFICANT CHANGE UP (ref 1.8–7.4)
NEUTROPHILS NFR BLD AUTO: 71.6 % — SIGNIFICANT CHANGE UP (ref 43–77)
NEUTROPHILS NFR BLD AUTO: 73.3 % — SIGNIFICANT CHANGE UP (ref 43–77)
NRBC # BLD: 0 /100 WBCS — SIGNIFICANT CHANGE UP (ref 0–0)
NRBC # BLD: 0 /100 WBCS — SIGNIFICANT CHANGE UP (ref 0–0)
PHOSPHATE SERPL-MCNC: 3.5 MG/DL — SIGNIFICANT CHANGE UP (ref 2.5–4.5)
PLATELET # BLD AUTO: 46 K/UL — LOW (ref 150–400)
PLATELET # BLD AUTO: 52 K/UL — LOW (ref 150–400)
POTASSIUM SERPL-MCNC: 3.3 MMOL/L — LOW (ref 3.5–5.3)
POTASSIUM SERPL-SCNC: 3.3 MMOL/L — LOW (ref 3.5–5.3)
PROT SERPL-MCNC: 5.8 G/DL — LOW (ref 6–8.3)
RBC # BLD: 2.8 M/UL — LOW (ref 4.2–5.8)
RBC # BLD: 3.24 M/UL — LOW (ref 4.2–5.8)
RBC # FLD: 16.9 % — HIGH (ref 10.3–14.5)
RBC # FLD: 17 % — HIGH (ref 10.3–14.5)
SODIUM SERPL-SCNC: 141 MMOL/L — SIGNIFICANT CHANGE UP (ref 135–145)
SPECIMEN SOURCE: SIGNIFICANT CHANGE UP
WBC # BLD: 3.2 K/UL — LOW (ref 3.8–10.5)
WBC # BLD: 4.24 K/UL — SIGNIFICANT CHANGE UP (ref 3.8–10.5)
WBC # FLD AUTO: 3.2 K/UL — LOW (ref 3.8–10.5)
WBC # FLD AUTO: 4.24 K/UL — SIGNIFICANT CHANGE UP (ref 3.8–10.5)

## 2024-09-27 PROCEDURE — 99232 SBSQ HOSP IP/OBS MODERATE 35: CPT | Mod: GC

## 2024-09-27 PROCEDURE — 99255 IP/OBS CONSLTJ NEW/EST HI 80: CPT

## 2024-09-27 RX ORDER — FOLIC ACID 1 MG/1
1 TABLET ORAL DAILY
Refills: 0 | Status: DISCONTINUED | OUTPATIENT
Start: 2024-09-27 | End: 2024-10-01

## 2024-09-27 RX ADMIN — THIAMINE HYDROCHLORIDE 105 MILLIGRAM(S): 100 INJECTION, SOLUTION INTRAMUSCULAR; INTRAVENOUS at 21:52

## 2024-09-27 RX ADMIN — FOLIC ACID 1 MILLIGRAM(S): 1 TABLET ORAL at 17:03

## 2024-09-27 RX ADMIN — PANTOPRAZOLE SODIUM 40 MILLIGRAM(S): 40 TABLET, DELAYED RELEASE ORAL at 17:02

## 2024-09-27 RX ADMIN — PIPERACILLIN SODIUM AND TAZOBACTAM SODIUM 25 GRAM(S): 12; 1.5 INJECTION, POWDER, LYOPHILIZED, FOR SOLUTION INTRAVENOUS at 21:50

## 2024-09-27 RX ADMIN — THIAMINE HYDROCHLORIDE 105 MILLIGRAM(S): 100 INJECTION, SOLUTION INTRAMUSCULAR; INTRAVENOUS at 14:30

## 2024-09-27 RX ADMIN — Medication 100 MILLIEQUIVALENT(S): at 05:27

## 2024-09-27 RX ADMIN — Medication 100 MILLIEQUIVALENT(S): at 07:43

## 2024-09-27 RX ADMIN — THIAMINE HYDROCHLORIDE 105 MILLIGRAM(S): 100 INJECTION, SOLUTION INTRAMUSCULAR; INTRAVENOUS at 05:25

## 2024-09-27 RX ADMIN — OCTREOTIDE ACETATE 10 MICROGRAM(S)/HR: 50 INJECTION, SOLUTION INTRAVENOUS; SUBCUTANEOUS at 12:35

## 2024-09-27 RX ADMIN — PIPERACILLIN SODIUM AND TAZOBACTAM SODIUM 25 GRAM(S): 12; 1.5 INJECTION, POWDER, LYOPHILIZED, FOR SOLUTION INTRAVENOUS at 13:17

## 2024-09-27 RX ADMIN — OCTREOTIDE ACETATE 10 MICROGRAM(S)/HR: 50 INJECTION, SOLUTION INTRAVENOUS; SUBCUTANEOUS at 21:51

## 2024-09-27 RX ADMIN — PIPERACILLIN SODIUM AND TAZOBACTAM SODIUM 25 GRAM(S): 12; 1.5 INJECTION, POWDER, LYOPHILIZED, FOR SOLUTION INTRAVENOUS at 05:27

## 2024-09-27 RX ADMIN — OCTREOTIDE ACETATE 10 MICROGRAM(S)/HR: 50 INJECTION, SOLUTION INTRAVENOUS; SUBCUTANEOUS at 01:46

## 2024-09-27 RX ADMIN — Medication 100 MILLIEQUIVALENT(S): at 06:43

## 2024-09-27 RX ADMIN — PANTOPRAZOLE SODIUM 40 MILLIGRAM(S): 40 TABLET, DELAYED RELEASE ORAL at 05:27

## 2024-09-27 RX ADMIN — CHLORHEXIDINE GLUCONATE ORAL RINSE 1 APPLICATION(S): 1.2 SOLUTION DENTAL at 05:26

## 2024-09-27 NOTE — PROGRESS NOTE ADULT - SUBJECTIVE AND OBJECTIVE BOX
PGY-1 Progress Note discussed with attending      PLEASE CONTACT ON CALL TEAM:  - On Call Team (Please refer to Carlos) FROM 5:00 PM - 8:30PM  - Nightfloat Team FROM 8:30 -7:30 AM    INTERVAL HPI/OVERNIGHT EVENTS:     No acute overnight events. Pt evaluated at bedside this morning. Pt endorses feeling better overall without any further episodes of bleeding. Reports occasional sensation to vomit, but no emesis. Patient mentioned mild throat irritation likely 2/2 recent extubation. No bowel movements since admission.    _________________________________________________  REVIEW OF SYSTEMS:  CONSTITUTIONAL: No acute distress  RESPIRATORY: No shortness of breath, wheezing, or cough  CARDIOVASCULAR: No chest pain or palpitations  GASTROINTESTINAL: No abdominal pain, nausea, vomiting, diarrhea  GENITOURINARY: No dysuria or hermaturia  NEUROLOGICAL: No numbness, tremors, or loss of strength  SKIN: No new lesions    MEDICATIONS  (STANDING):  chlorhexidine 2% Cloths 1 Application(s) Topical <User Schedule>  folic acid Injectable 1 milliGRAM(s) IV Push daily  influenza   Vaccine 0.5 milliLiter(s) IntraMuscular once  octreotide  Infusion 50 MICROgram(s)/Hr (10 mL/Hr) IV Continuous <Continuous>  pantoprazole  Injectable 40 milliGRAM(s) IV Push two times a day  piperacillin/tazobactam IVPB.. 3.375 Gram(s) IV Intermittent every 8 hours  thiamine IVPB 500 milliGRAM(s) IV Intermittent every 8 hours    MEDICATIONS  (PRN):  ondansetron Injectable 4 milliGRAM(s) IV Push every 8 hours PRN Nausea and/or Vomiting      Vital Signs Last 24 Hrs  T(C): 37.2 (27 Sep 2024 07:00), Max: 37.9 (26 Sep 2024 09:00)  T(F): 99 (27 Sep 2024 07:00), Max: 100.2 (26 Sep 2024 09:00)  HR: 68 (27 Sep 2024 07:00) (63 - 82)  BP: 120/75 (27 Sep 2024 04:00) (104/63 - 126/78)  BP(mean): 87 (27 Sep 2024 04:00) (76 - 92)  RR: 13 (27 Sep 2024 07:00) (11 - 18)  SpO2: 96% (27 Sep 2024 07:00) (95% - 100%)    Parameters below as of 27 Sep 2024 07:00  Patient On (Oxygen Delivery Method): room air      _________________________________________________  PHYSICAL EXAMINATION:  GENERAL: NAD  HEAD:  Atraumatic, Normocephalic  EYES:  conjunctiva and sclera clear  NECK: Supple  CHEST/LUNG: Clear to auscultation.  HEART: Regular rate and rhythm; No murmurs, rubs, or gallops  ABDOMEN: Soft, Nontender, Bowel sounds in all 4 quadrants, no masses on palpation  NERVOUS SYSTEM:  Alert and orientedx3  EXTREMITIES:  No BLE edema  SKIN: warm, dry    I&O's Summary    26 Sep 2024 07:01  -  27 Sep 2024 07:00  --------------------------------------------------------  IN: 1171.7 mL / OUT: 1955 mL / NET: -783.3 mL    27 Sep 2024 07:01  -  27 Sep 2024 08:40  --------------------------------------------------------  IN: 10 mL / OUT: 0 mL / NET: 10 mL      _________________________________________________  LABS:                        7.6    3.20  )-----------( 46       ( 27 Sep 2024 03:13 )             23.0     09-27    141  |  112[H]  |  15  ----------------------------<  97  3.3[L]   |  21[L]  |  0.89    Ca    8.2[L]      27 Sep 2024 03:13  Phos  3.5     09-27  Mg     1.6     09-27    TPro  5.8[L]  /  Alb  2.9[L]  /  TBili  0.9  /  DBili  x   /  AST  14  /  ALT  30  /  AlkPhos  71  09-27    LIVER FUNCTIONS - ( 27 Sep 2024 03:13 )  Alb: 2.9 g/dL / Pro: 5.8 g/dL / ALK PHOS: 71 U/L / ALT: 30 U/L DA / AST: 14 U/L / GGT: x           CAPILLARY BLOOD GLUCOSE      POCT Blood Glucose.: 130 mg/dL (27 Sep 2024 05:44)        PT/INR - ( 26 Sep 2024 03:40 )   PT: 14.5 sec;   INR: 1.25 ratio         PTT - ( 25 Sep 2024 18:10 )  PTT:26.6 sec      Culture - Sputum (collected 26 Sep 2024 09:40)  Source: ET Tube ET Tube  Gram Stain (26 Sep 2024 21:44):    Numerous polymorphonuclear leukocytes per low power field    No Squamous epithelial cells per low power field    No organisms seen per oil power field  Preliminary Report (27 Sep 2024 08:29):    Normal Respiratory Nano present      Urinalysis Basic - ( 27 Sep 2024 03:13 )    Color: x / Appearance: x / SG: x / pH: x  Gluc: 97 mg/dL / Ketone: x  / Bili: x / Urobili: x   Blood: x / Protein: x / Nitrite: x   Leuk Esterase: x / RBC: x / WBC x   Sq Epi: x / Non Sq Epi: x / Bacteria: x      _________________________________________________  RADIOLOGY & ADDITIONAL TESTS:    EGD 9/25: Esophagus: bright red blood. Distal esophagus reached, active arterial bleed visualized -varix with nipple sign, squirting, Scope withdrawn, banding device applied - initial one misfired, new one placed. Scope initially clogged by clot, eventually clot cleared with brush and then 4 bands applied. Hemostasis achieved.       Imaging Personally Reviewed:  YES    Consultant(s) Notes Reviewed:   YES    Care Discussed with Consultants : YES     Plan of care was discussed with patient and /or primary care giver; all questions and concerns were addressed and care was aligned with patient's wishes.

## 2024-09-27 NOTE — PHYSICAL THERAPY INITIAL EVALUATION ADULT - BALANCE DISTURBANCE, IDENTIFIED IMPAIRMENT CONTRIBUTE, REHAB EVAL
Per pharmacy PA is needed for the following medication. Diclofenac Sodium 1 % Transdermal Gel, Apply 4 g topically 4 (four) times daily. Apply to affected area(s). , Disp: 60 g, Rfl: 1    Key: [id number]  Patient Last Name: Alyson Montes  : 1960 impaired postural control/decreased strength

## 2024-09-27 NOTE — CONSULT NOTE ADULT - CONSULT REASON
r/o AUGIB s/p 6 episodes of hematamesis
Cirrhosis
Prophylaxis for Variceal bleeding/ low grade fever.
Upper GI bleed

## 2024-09-27 NOTE — PROGRESS NOTE ADULT - ASSESSMENT
Assessment:  48-year-old male with pmhx of cirrhosis and right knee pain presenting with hematemesis. Patient was in endoscopy suite getting EGD RRT called for perfuse hematemesis. Patient was intubated. EGD performed, banded. MTP called received 2PRBC and 1FFP Cordis placed, arterial line placed. Patient transferred to ICU for variceal bleed, H/H stable    Plan:  Neuro:  #no issues    Cardiovascular:  #No issues     Pulmonary:   #AHRF 2/2 GIB, resolved  - s/p intubated for airway protection on 9/25, extubated on 9/26  - satting well on RA    Infectious Diseases:  #concern for sepsis   - concern for SBP vs aspiration PNA vs ?infection from cordis with hx of cirrhosis, profuse bleeding from varices during EGD  - temp: 100.2 at AM, WBC: 3.54 on 9/26, cordis removed on 9/26  - afebrile on 9/27, right radial arterial line removed on 9/27  - sputum Cx: neg  - f/u BCx  - c/w zosyn 3.375g q8h (9/25 - )  - ID Dr. 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  p/w hematemesis, was taking ibuprofen every other day for 1 week before ED admission for right knee pain  CT angio A/P: wall thickening along inferior esophagus, underlying esophageal tear cannot be excluded, marked splenomegaly, stomach distended with fluid and blood products  EGD: esophagus - bright red blood, active arterial bleed visualized at distal esophagus   Initial Hb: 6.5> 1PRBC, 1PLT> 7.2> 2PRBC, 2FFP, 2PLT > 6.7 > 1PRBC > 7.1 > 7.2 >7.4>7.5>7.6 - H/H stable  MELD score: 9  s/p EGD w/ banding x4 on 9/25  c/w octreotide drip for 72 h (9/25 - 9/27) - plan to d/c octreotide on 9/28  c/w PPI IV BID, thiamine and folic acid  Advance diet to clears. On Saturday and Sunday advance to soft diet. Start regular diet after Monday  GI following  consulted hepatology Dr. Mayo    Renal:  #Hypernatremia, resolved  2/2 poor oral intake    #TOV on 9/27    Heme/onc:   #Thrombocytopenia, leukopenia   #Anemia 2/2 blood loss  2/2 cirrhosis   s/p 4PRBC, 3 PLT, 2 FFP   H/H stable  Maintain active type and screen   Transfuse for Hb < 7   Trend CBC    Endo:   #No issues     Skin/ catheter:   #Peripheral IVs     Prophylaxis:   #DVT ppx SCD    Goals of Care: Full code     Dispo: ICU    Assessment:  48-year-old male with pmhx of cirrhosis and right knee pain presenting with hematemesis. Patient was in endoscopy suite getting EGD RRT called for perfuse hematemesis. Patient was intubated. EGD performed, banded. MTP called received 2PRBC and 1FFP Cordis placed, arterial line placed. Patient transferred to ICU for variceal bleed, H/H stable    Plan:  Neuro:  #no issues    Cardiovascular:  #No issues     Pulmonary:   #AHRF 2/2 GIB, resolved  - s/p intubated for airway protection on 9/25, extubated on 9/26  - satting well on RA    Infectious Diseases:  #concern for sepsis   - concern for SBP vs aspiration PNA vs ?infection from cordis with hx of cirrhosis, profuse bleeding from varices during EGD  - temp: 100.2 at AM, WBC: 3.54 on 9/26, cordis removed on 9/26  - afebrile on 9/27, right radial arterial line removed on 9/27  - sputum Cx: neg  - f/u BCx  - c/w zosyn 3.375g q8h (9/25 - )  - ID Dr. 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  p/w hematemesis, was taking ibuprofen every other day for 1 week before ED admission for right knee pain  CT angio A/P: wall thickening along inferior esophagus, underlying esophageal tear cannot be excluded, marked splenomegaly, stomach distended with fluid and blood products  EGD: esophagus - bright red blood, active arterial bleed visualized at distal esophagus   Initial Hb: 6.5> 1PRBC, 1PLT> 7.2> 2PRBC, 2FFP, 2PLT > 6.7 > 1PRBC > 7.1 > 7.2 >7.4>7.5>7.6 - H/H stable  MELD score: 9  s/p EGD w/ banding x4 on 9/25  c/w octreotide drip for 72 h (9/25 - 9/27) - plan to d/c octreotide on 9/28  c/w PPI IV BID, thiamine and folic acid  Advance diet to clears. On Saturday and Sunday advance to soft diet. Start regular diet after Monday  GI following  consulted hepatology Dr. Mayo    Renal:  #Hypernatremia, resolved  2/2 poor oral intake    #TOV on 9/27    Heme/onc:   #Thrombocytopenia, leukopenia   #Anemia 2/2 blood loss  2/2 cirrhosis   s/p 4PRBC, 3 PLT, 2 FFP   H/H stable  Maintain active type and screen   Transfuse for Hb < 7   Trend CBC q12h    Endo:   #No issues     Skin/ catheter:   #Peripheral IVs     Prophylaxis:   #DVT ppx SCD    Goals of Care: Full code     Dispo: ICU

## 2024-09-27 NOTE — CONSULT NOTE ADULT - ASSESSMENT
48y Male w/ Hx of former AUD (reportedly quit 7 years ago), cirrhosis, presented to [facility] ER on 9/25/24 w/ UGIB (hematemesis x5) in setting of cirrhosis and taking ibuprofen for knee pain every other day for a week, and also reported melena ~ 2 weeks prior to admission, was found to be hypotensive, anemic (Hb 6.5 on admission), CT /p showed wall thickening along the inferior esophagus raising possibility of esophageal tear, as well as marked splenomegaly (22 cm), and became unstable during EGD, requiring initiation of MTP and transfer to ICU. EGD  9/25/24 showed bright red blood in esophagus with active arterial (?) bleed, varix w/ nipple sign squirting and got banded (x4).   Hepatology was consulted 9/27 b/o cirrhosis.     UGIB in setting of cirrhosis and NSAID use  - C/w mgmt per GI  - Agree w/ iv PPI bid, to c/w octreotide and SBP ppx  - Monitor H/H, keep Hb >7, and keep PLT > 50 and fibrinogen > 120 if bleeding  - If rebleeds, consider transfer to [facility]    Cirrhosis, MELD 3.0 9  - Patient does not meet criteria for alcoholic hepatitis (no jaundice, no recent alcohol use)  - Please, send Peth to confirm abstinence  - No PSE, monitor mental status  - No ascites, Cr wNL, monitor renal function  - Transplant candidacy: need further information, MELD is low, but variceal bleed is decompensation  - F/u final infectious workup  - Send CLD workup, including hepatitis serologies, autoimmune workup, A1AT, ceruloplasmin    Thrombocytopenia  Marked splenomegaly  - While patient has cirrhosis and liver slightly nodular, splenomegaly is very significant, consider hematology consult to evaluate for any secondary causes in addition to cirrhosis      Thank you for consult  Hepatology will follow  D/w primary team on 9/27

## 2024-09-27 NOTE — CONSULT NOTE ADULT - SUBJECTIVE AND OBJECTIVE BOX
Chief Complaint:  Patient is a 48y old  Male who presents with a chief complaint of GI bleed (27 Sep 2024 08:39)      HPI:HERLINDA COUCH is a 48y Male    PMHX/PSHX:  Cirrhosis      Allergies:  No Known Allergies      Home Medications: reviewed  Hospital Medications:  chlorhexidine 2% Cloths 1 Application(s) Topical <User Schedule>  folic acid Injectable 1 milliGRAM(s) IV Push daily  influenza   Vaccine 0.5 milliLiter(s) IntraMuscular once  octreotide  Infusion 50 MICROgram(s)/Hr IV Continuous <Continuous>  ondansetron Injectable 4 milliGRAM(s) IV Push every 8 hours PRN  pantoprazole  Injectable 40 milliGRAM(s) IV Push two times a day  piperacillin/tazobactam IVPB.. 3.375 Gram(s) IV Intermittent every 8 hours  thiamine IVPB 500 milliGRAM(s) IV Intermittent every 8 hours      Social History:   Tob: Denies  EtOH: Denies  Illicit Drugs: Denies    Family history:    Denies family history of colon cancer/polyps, stomach cancer/polyps, pancreatic cancer/masses, liver cancer/disease, ovarian cancer and endometrial cancer.    ROS:   General:  No  fevers, chills, night sweats, fatigue  Eyes:  Good vision, no reported pain  ENT:  No sore throat, pain, runny nose  CV:  No pain, palpitations  Pulm:  No dyspnea, cough  GI:  See HPI, otherwise negative  :  No  incontinence, nocturia  Muscle:  No pain, weakness  Neuro:  No memory problems  Psych:  No insomnia, mood problems, depression  Endocrine:  No polyuria, polydipsia, cold/heat intolerance  Heme:  No petechiae, ecchymosis, easy bruisability  Skin:  No rash    PHYSICAL EXAM:   Vital Signs:  Vital Signs Last 24 Hrs  T(C): 37.2 (27 Sep 2024 08:00), Max: 37.9 (26 Sep 2024 20:00)  T(F): 99 (27 Sep 2024 08:00), Max: 100.2 (26 Sep 2024 20:00)  HR: 84 (27 Sep 2024 10:00) (61 - 84)  BP: 116/76 (27 Sep 2024 08:00) (104/63 - 126/78)  BP(mean): 88 (27 Sep 2024 08:00) (76 - 92)  RR: 17 (27 Sep 2024 10:00) (12 - 18)  SpO2: 94% (27 Sep 2024 10:00) (94% - 100%)    Parameters below as of 27 Sep 2024 07:00  Patient On (Oxygen Delivery Method): room air      Daily     Daily Weight in k (27 Sep 2024 07:00)    GENERAL: no acute distress  NEURO: alert, no asterixis  HEENT: anicteric sclera, no conjunctival pallor appreciated  CHEST: no respiratory distress, no accessory muscle use  CARDIAC: regular rate, rhythm  ABDOMEN: soft, non-tender, non-distended, no rebound or guarding  EXTREMITIES: warm, well perfused, no edema  SKIN: no lesions noted    LABS: reviewed                        7.6    3.20  )-----------( 46       ( 27 Sep 2024 03:13 )             23.0     09-    141  |  112[H]  |  15  ----------------------------<  97  3.3[L]   |  21[L]  |  0.89    Ca    8.2[L]      27 Sep 2024 03:13  Phos  3.5     -  Mg     1.6         TPro  5.8[L]  /  Alb  2.9[L]  /  TBili  0.9  /  DBili  x   /  AST  14  /  ALT  30  /  AlkPhos  71  09-27    LIVER FUNCTIONS - ( 27 Sep 2024 03:13 )  Alb: 2.9 g/dL / Pro: 5.8 g/dL / ALK PHOS: 71 U/L / ALT: 30 U/L DA / AST: 14 U/L / GGT: x             Culture - Sputum (collected 26 Sep 2024 09:40)  Source: ET Tube ET Tube  Gram Stain (26 Sep 2024 21:44):    Numerous polymorphonuclear leukocytes per low power field    No Squamous epithelial cells per low power field    No organisms seen per oil power field  Preliminary Report (27 Sep 2024 08:29):    Normal Respiratory Nano present        Diagnostic Studies: see sunrise for full report         Chief Complaint:  Patient is a 48y old  Male who presents with a chief complaint of GI bleed (27 Sep 2024 08:39)      HPI:  HERLINDA COUCH is a 48y Male w/ Hx of former AUD (reportedly quit 7 years ago), cirrhosis, presented to [facility] ER on 24 w/ UGIB (hematemesis x5) in setting of cirrhosis and taking ibuprofen for knee pain every other day for a week, and also reported melena ~ 2 weeks prior to admission, was found to be hypotensive, anemic (Hb 6.5 on admission), CT /p showed wall thickening along the inferior esophagus raising possibility of esophageal tear, as well as marked splenomegaly (22 cm), and became unstable during EGD, requiring initiation of MTP and transfer to ICU. EGD  24 showed bright red blood in esophagus with active arterial (?) bleed, varix w/ nipple sign squirting and got banded (x4).   Hepatology was consulted  b/o cirrhosis.   Patient was seen and examined w/ native [place] speaking medical resident.    PMHX/PSHX:    Cirrhosis      Allergies:  No Known Allergies      Home Medications: reviewed  Hospital Medications:  chlorhexidine 2% Cloths 1 Application(s) Topical <User Schedule>  folic acid Injectable 1 milliGRAM(s) IV Push daily  influenza   Vaccine 0.5 milliLiter(s) IntraMuscular once  octreotide  Infusion 50 MICROgram(s)/Hr IV Continuous <Continuous>  ondansetron Injectable 4 milliGRAM(s) IV Push every 8 hours PRN  pantoprazole  Injectable 40 milliGRAM(s) IV Push two times a day  piperacillin/tazobactam IVPB.. 3.375 Gram(s) IV Intermittent every 8 hours  thiamine IVPB 500 milliGRAM(s) IV Intermittent every 8 hours      Social History:   Tob: Denies  EtOH: Denies recent  Illicit Drugs: Denies    Family history:    Denies family history of colon cancer/polyps, stomach cancer/polyps, pancreatic cancer/masses, liver cancer/disease, ovarian cancer and endometrial cancer.    ROS:   General:  No  fevers, chills, night sweats, fatigue  Eyes:  Good vision, no reported pain  ENT:  No sore throat, pain, runny nose  CV:  No pain, palpitations  Pulm:  No dyspnea, cough  GI:  See HPI, otherwise negative  :  No  dysuria  Muscle:  No pain, weakness  Neuro:  No memory problems  Skin:  No rash    PHYSICAL EXAM:   Vital Signs:  Vital Signs Last 24 Hrs  T(C): 37.2 (27 Sep 2024 08:00), Max: 37.9 (26 Sep 2024 20:00)  T(F): 99 (27 Sep 2024 08:00), Max: 100.2 (26 Sep 2024 20:00)  HR: 84 (27 Sep 2024 10:00) (61 - 84)  BP: 116/76 (27 Sep 2024 08:00) (104/63 - 126/78)  BP(mean): 88 (27 Sep 2024 08:00) (76 - 92)  RR: 17 (27 Sep 2024 10:00) (12 - 18)  SpO2: 94% (27 Sep 2024 10:00) (94% - 100%)    Parameters below as of 27 Sep 2024 07:00  Patient On (Oxygen Delivery Method): room air      Daily     Daily Weight in k (27 Sep 2024 07:00)    GENERAL: no acute distress  NEURO: AAOX3, no asterixis  HEENT: anicteric sclera, no conjunctival pallor appreciated  CHEST: no respiratory distress, no accessory muscle use  CARDIAC: regular rate, rhythm  ABDOMEN: soft, non-tender, non-distended, no rebound or guarding, BS+  EXTREMITIES: warm, well perfused, no edema  SKIN: no lesions noted    LABS: reviewed                        7.6    3.20  )-----------( 46       ( 27 Sep 2024 03:13 )             23.0     -    141  |  112[H]  |  15  ----------------------------<  97  3.3[L]   |  21[L]  |  0.89    Ca    8.2[L]      27 Sep 2024 03:13  Phos  3.5       Mg     1.6         TPro  5.8[L]  /  Alb  2.9[L]  /  TBili  0.9  /  DBili  x   /  AST  14  /  ALT  30  /  AlkPhos  71  -27    LIVER FUNCTIONS - ( 27 Sep 2024 03:13 )  Alb: 2.9 g/dL / Pro: 5.8 g/dL / ALK PHOS: 71 U/L / ALT: 30 U/L DA / AST: 14 U/L / GGT: x             Culture - Sputum (collected 26 Sep 2024 09:40)  Source: ET Tube ET Tube  Gram Stain (26 Sep 2024 21:44):    Numerous polymorphonuclear leukocytes per low power field    No Squamous epithelial cells per low power field    No organisms seen per oil power field  Preliminary Report (27 Sep 2024 08:29):    Normal Respiratory Nano present        Diagnostic Studies: see sunrise for full report

## 2024-09-27 NOTE — PHYSICAL THERAPY INITIAL EVALUATION ADULT - PATIENT PROFILE REVIEW, REHAB EVAL
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  He has had four separate procedures on his right knee for torn tendons with his prosthesis post fall.  He is wearing a walking boot and using hiking sticks for stabilization.  He denies symptoms of CP, LH, dizziness, or SOB.  Medication list reviewed with patient, he is compliant with Metoprolol .      Incision well healed.  No pocket discomfort.     Normal device function.   Battery voltage, sensing, and thresholds are normal.   Lead impedances are stable.   Battery longevity: 8.5 years    Device interrogation reviewed and confirmed by Dr. Bender.      Percent paced: A-38%                           V-100%    AHR episodes: no events  VHR episodes: no events      Plan:   Remote in: 3 months  Return to clinic: 6 months     EMR, labs, radiology and imaging reports reviewed/yes

## 2024-09-27 NOTE — PHYSICAL THERAPY INITIAL EVALUATION ADULT - PERTINENT HX OF CURRENT PROBLEM, REHAB EVAL
Admitted due to GI Bleed; s/p intubation on 9/25, extubated on 9/26  pmhx of cirrhosis and right knee pain -self medicated with ibuprofen

## 2024-09-27 NOTE — PROGRESS NOTE ADULT - PROBLEM SELECTOR PLAN 1
CT angio A/P: wall thickening along inferior esophagus, r/o underlying esophageal tear, marked splenomegaly, stomach distended with fluid and blood products  EGD 9/25 significant for active variceal bleeds requiring 4 bands    Hb: 6.5> 1PRBC, 1PLT> 7.2> 2PRBC, 2FFP, 2PLT > 6.7 > 1PRBC > 7.1>>>7.6   (9/27) Hgb stabilizing. No evidence of continued bleeding  May advance diet conservatively: CLD today, soft and bite sized over the weekend if pt tolerates  cont octreotide gtt (9/25-9/27)  monitor CBC, transfuse If Hgb <7  LFTs remain wnl  if pt continues to have bleeding episodes, may have to re-scope band integrity / rule out tear or ulcerations  c/w PPI IV BID  Avoid NG tube iso recent variceal bleed

## 2024-09-27 NOTE — PHYSICAL THERAPY INITIAL EVALUATION ADULT - PATIENT/FAMILY/SIGNIFICANT OTHER GOALS STATEMENT, PT EVAL
patient wants to return home and be able to perform usual mobility and functional tasks independently and without difficulty, without an assistive device

## 2024-09-27 NOTE — PROGRESS NOTE ADULT - SUBJECTIVE AND OBJECTIVE BOX
Patient is a 48y old  Male who presents with a chief complaint of GI bleed (26 Sep 2024 17:57)      OVERNIGHT EVENTS:   No overnight events   Afebrile, hemodynamically stable     SUBJECTIVE/INTERVAL HPI: Patient seen and examined at bedside.  Ltaonya Ragland [id number] used. Denies fever, chills, chest pain, shortness of breath, abdominal pain. States he has a sore throat that is similar to yesterday's sore throat after extubation.    PRESSORS: [ ] YES [ ] NO  WHICH:    ICU Vital Signs Last 24 Hrs  T(C): 37.2 (27 Sep 2024 07:00), Max: 37.9 (26 Sep 2024 09:00)  T(F): 99 (27 Sep 2024 07:00), Max: 100.2 (26 Sep 2024 09:00)  HR: 68 (27 Sep 2024 07:00) (63 - 82)  BP: 120/75 (27 Sep 2024 04:00) (104/63 - 126/78)  BP(mean): 87 (27 Sep 2024 04:00) (76 - 92)  ABP: 130/77 (27 Sep 2024 07:00) (91/79 - 133/74)  ABP(mean): 98 (27 Sep 2024 07:00) (73 - 98)  RR: 13 (27 Sep 2024 07:00) (11 - 18)  SpO2: 96% (27 Sep 2024 07:00) (95% - 100%)    O2 Parameters below as of 27 Sep 2024 07:00  Patient On (Oxygen Delivery Method): room air          I&O's Summary    26 Sep 2024 07:01  -  27 Sep 2024 07:00  --------------------------------------------------------  IN: 1171.7 mL / OUT: 1955 mL / NET: -783.3 mL    27 Sep 2024 07:01  -  27 Sep 2024 08:23  --------------------------------------------------------  IN: 10 mL / OUT: 0 mL / NET: 10 mL      Mode: CPAP with PS  FiO2: 40  PEEP: 5  PS: 10  ITime: 1  MAP: 7  PIP: 11      PHYSICAL EXAM:  GENERAL: No acute distress   HEAD:  Atraumatic, Normocephalic  EYES: EOMI, PERRLA, conjunctiva and sclera clear  ENMT: Moist mucous membranes  NECK: Supple, No JVD  HEART: Regular rate and rhythm; No murmurs, rubs, or gallops  RESPIRATORY: CTA B/L, No W/R/R  ABDOMEN: Soft, Nontender, minimal abdominal distention; Bowel sounds present  NEUROLOGY: A&Ox3, nonfocal, moving all extremities  EXTREMITIES:  2+ Peripheral Pulses, No clubbing, cyanosis, or edema  SKIN: warm, dry, normal color, no rash or abnormal lesions    LABS:                        7.6    3.20  )-----------( 46       ( 27 Sep 2024 03:13 )             23.0     09-27    141  |  112[H]  |  15  ----------------------------<  97  3.3[L]   |  21[L]  |  0.89    Ca    8.2[L]      27 Sep 2024 03:13  Phos  3.5     09-27  Mg     1.6     09-27    TPro  5.8[L]  /  Alb  2.9[L]  /  TBili  0.9  /  DBili  x   /  AST  14  /  ALT  30  /  AlkPhos  71  09-27    PT/INR - ( 26 Sep 2024 03:40 )   PT: 14.5 sec;   INR: 1.25 ratio         PTT - ( 25 Sep 2024 18:10 )  PTT:26.6 sec  Urinalysis Basic - ( 27 Sep 2024 03:13 )    Color: x / Appearance: x / SG: x / pH: x  Gluc: 97 mg/dL / Ketone: x  / Bili: x / Urobili: x   Blood: x / Protein: x / Nitrite: x   Leuk Esterase: x / RBC: x / WBC x   Sq Epi: x / Non Sq Epi: x / Bacteria: x      CAPILLARY BLOOD GLUCOSE      POCT Blood Glucose.: 130 mg/dL (27 Sep 2024 05:44)    ABG - ( 26 Sep 2024 03:13 )  pH, Arterial: 7.38  pH, Blood: x     /  pCO2: 40    /  pO2: 136   / HCO3: 24    / Base Excess: -1.3  /  SaO2: 100                 Consultant(s) Notes Reviewed:  [x ] YES  [ ] NO    MEDICATIONS  (STANDING):  chlorhexidine 2% Cloths 1 Application(s) Topical <User Schedule>  folic acid Injectable 1 milliGRAM(s) IV Push daily  influenza   Vaccine 0.5 milliLiter(s) IntraMuscular once  octreotide  Infusion 50 MICROgram(s)/Hr (10 mL/Hr) IV Continuous <Continuous>  pantoprazole  Injectable 40 milliGRAM(s) IV Push two times a day  piperacillin/tazobactam IVPB.. 3.375 Gram(s) IV Intermittent every 8 hours  thiamine IVPB 500 milliGRAM(s) IV Intermittent every 8 hours    MEDICATIONS  (PRN):  ondansetron Injectable 4 milliGRAM(s) IV Push every 8 hours PRN Nausea and/or Vomiting      Care Discussed with Consultants/Other Providers [ x] YES  [ ] NO    RADIOLOGY & ADDITIONAL TESTS: Patient is a 48y old  Male who presents with a chief complaint of GI bleed (26 Sep 2024 17:57)      OVERNIGHT EVENTS:   No overnight events   Afebrile, hemodynamically stable     SUBJECTIVE/INTERVAL HPI: Patient seen and examined at bedside.  Latonya Ragland [id number] used. Denies fever, chills, chest pain, shortness of breath, abdominal pain. States he has a sore throat that is similar to yesterday's sore throat after extubation. No BM. No signs of bleeding    PRESSORS: [ ] YES [ ] NO  WHICH:    ICU Vital Signs Last 24 Hrs  T(C): 37.2 (27 Sep 2024 07:00), Max: 37.9 (26 Sep 2024 09:00)  T(F): 99 (27 Sep 2024 07:00), Max: 100.2 (26 Sep 2024 09:00)  HR: 68 (27 Sep 2024 07:00) (63 - 82)  BP: 120/75 (27 Sep 2024 04:00) (104/63 - 126/78)  BP(mean): 87 (27 Sep 2024 04:00) (76 - 92)  ABP: 130/77 (27 Sep 2024 07:00) (91/79 - 133/74)  ABP(mean): 98 (27 Sep 2024 07:00) (73 - 98)  RR: 13 (27 Sep 2024 07:00) (11 - 18)  SpO2: 96% (27 Sep 2024 07:00) (95% - 100%)    O2 Parameters below as of 27 Sep 2024 07:00  Patient On (Oxygen Delivery Method): room air          I&O's Summary    26 Sep 2024 07:01  -  27 Sep 2024 07:00  --------------------------------------------------------  IN: 1171.7 mL / OUT: 1955 mL / NET: -783.3 mL    27 Sep 2024 07:01  -  27 Sep 2024 08:23  --------------------------------------------------------  IN: 10 mL / OUT: 0 mL / NET: 10 mL      Mode: CPAP with PS  FiO2: 40  PEEP: 5  PS: 10  ITime: 1  MAP: 7  PIP: 11      PHYSICAL EXAM:  GENERAL: No acute distress   HEAD:  Atraumatic, Normocephalic  EYES: EOMI, PERRLA, conjunctiva and sclera clear  ENMT: Moist mucous membranes  NECK: Supple, No JVD  HEART: Regular rate and rhythm; No murmurs, rubs, or gallops  RESPIRATORY: CTA B/L, No W/R/R  ABDOMEN: Soft, Nontender, minimal abdominal distention; Bowel sounds present  NEUROLOGY: A&Ox3, nonfocal, moving all extremities  EXTREMITIES:  2+ Peripheral Pulses, No clubbing, cyanosis, or edema  SKIN: warm, dry, normal color, no rash or abnormal lesions    LABS:                        7.6    3.20  )-----------( 46       ( 27 Sep 2024 03:13 )             23.0     09-27    141  |  112[H]  |  15  ----------------------------<  97  3.3[L]   |  21[L]  |  0.89    Ca    8.2[L]      27 Sep 2024 03:13  Phos  3.5     09-27  Mg     1.6     09-27    TPro  5.8[L]  /  Alb  2.9[L]  /  TBili  0.9  /  DBili  x   /  AST  14  /  ALT  30  /  AlkPhos  71  09-27    PT/INR - ( 26 Sep 2024 03:40 )   PT: 14.5 sec;   INR: 1.25 ratio         PTT - ( 25 Sep 2024 18:10 )  PTT:26.6 sec  Urinalysis Basic - ( 27 Sep 2024 03:13 )    Color: x / Appearance: x / SG: x / pH: x  Gluc: 97 mg/dL / Ketone: x  / Bili: x / Urobili: x   Blood: x / Protein: x / Nitrite: x   Leuk Esterase: x / RBC: x / WBC x   Sq Epi: x / Non Sq Epi: x / Bacteria: x      CAPILLARY BLOOD GLUCOSE      POCT Blood Glucose.: 130 mg/dL (27 Sep 2024 05:44)    ABG - ( 26 Sep 2024 03:13 )  pH, Arterial: 7.38  pH, Blood: x     /  pCO2: 40    /  pO2: 136   / HCO3: 24    / Base Excess: -1.3  /  SaO2: 100                 Consultant(s) Notes Reviewed:  [x ] YES  [ ] NO    MEDICATIONS  (STANDING):  chlorhexidine 2% Cloths 1 Application(s) Topical <User Schedule>  folic acid Injectable 1 milliGRAM(s) IV Push daily  influenza   Vaccine 0.5 milliLiter(s) IntraMuscular once  octreotide  Infusion 50 MICROgram(s)/Hr (10 mL/Hr) IV Continuous <Continuous>  pantoprazole  Injectable 40 milliGRAM(s) IV Push two times a day  piperacillin/tazobactam IVPB.. 3.375 Gram(s) IV Intermittent every 8 hours  thiamine IVPB 500 milliGRAM(s) IV Intermittent every 8 hours    MEDICATIONS  (PRN):  ondansetron Injectable 4 milliGRAM(s) IV Push every 8 hours PRN Nausea and/or Vomiting      Care Discussed with Consultants/Other Providers [ x] YES  [ ] NO    RADIOLOGY & ADDITIONAL TESTS:

## 2024-09-27 NOTE — PROGRESS NOTE ADULT - ASSESSMENT
"[facility]  Hospitalist Discharge Summary      Date of Admission:  7/19/2024  Date of Discharge:  7/20/2024  Discharging Provider: Tamara Baltazar MD  Discharge Service: Hospitalist Service    Discharge Diagnoses     Acute appendicitis  Depression  Tobacco use    Clinically Significant Risk Factors     # Obesity: Estimated body mass index is 35.99 kg/m  as calculated from the following:    Height as of this encounter: 1.549 m (5' 1\").    Weight as of this encounter: 86.4 kg (190 lb 7.6 oz).       Follow-ups Needed After Discharge   --- Follow-up with surgery as recommended        Discharge Disposition   Discharged to home  Condition at discharge: Stable    Hospital Course     32-year-old healthy female with a history of tobacco use and depression sent down from [facility] for further workup and evaluation acute appendicitis.  Sent here due to lack of surgeon availability.  Came into the hospital was given IV antibiotics and taken to the OR.  Underwent uneventful laparoscopic appendectomy.  Recovered well from PACU and discharged home in good condition.    Consultations This Hospital Stay   SURGERY GENERAL IP CONSULT  PHARMACY IP CONSULT    Code Status   Full Code    Time Spent on this Encounter   I, Tamara Baltazar MD, personally saw the patient today and spent less than or equal to 30 minutes discharging this patient.       Tamara Baltazar MD  [facility] PACU  [address] [place], [place] [id number]  Phone: [phone]  Fax: [phone]  ______________________________________________________________________    Physical Exam   Vital Signs: Temp: 97  F (36.1  C) Temp src: Temporal BP: 121/77 Pulse: 75   Resp: 14 SpO2: 97 % O2 Device: None (Room air) Oxygen Delivery: 2 LPM  Weight: 190 lbs 7.64 oz  General Appearance: Pleasant female no apparent distress  Respiratory: Clear to auscultation  Cardiovascular: Regular rate and rhythm  GI: Right lower quadrant tenderness        "   Primary Care Physician   Adalgisa Hernandez DNP,    Discharge Orders      When to call - Contact Surgeon Team    You may experience symptoms that require follow-up before your scheduled appointment. Contact your Surgeon Team if you are concerned about pain control, large amount of bleeding, blood clots, constipation, or if you experience signs of infection (fever, growing tenderness at the surgery site, a large amount of drainage, severe pain, foul-smelling drainage, redness or swelling.     When to call - Reach out to Urgent Care    If you are experiencing uncontrolled Nausea and Vomiting, uncontrolled pain, inability to urinate and uncomfortable, and in need of immediate care, and you are NOT able to reach your Surgeon Team, go to an Urgent Care clinic. Do NOT go to the Emergency Room unless you have shortness of breath, chest pain, or other signs of a medical emergency.     When to call - Reasons to Call 911    Call 911 immediately if you experience sudden-onset chest pain, arm weakness/numbness, slurred speech, or shortness of breath     Symptoms - Fever Management    A low grade fever can be expected after surgery. Your Provider many have prescribed an Opioid pain medication that also contains acetaminophen (TYLENOL) that may help with Fever management.  Do NOT take additional acetaminophen (TYLENOL) in combination with an Opioid/acetaminophen (TYLENOL) product. Read the labels on your Over The Counter (OTC) medications with care.     Symptoms - Reduced Urine Output    If it has been greater than 8 hours since you have urinated despite drinking plenty of water, call your Surgeon Team.     No driving or operating machinery    Do NOT drive any vehicle or operate mechanical equipment for 24 hours following the end of your surgery.  Even though you may feel normal, your reactions may be affected by Anesthesia medication you received.     No Alcohol    Do NOT drink alcoholic beverages for 24 hours following your  surgery and while taking pain medications.     Diet Instructions    Follow your surgeon's orders for any diet restrictions.  If you did not receive any diet restrictions, you may drink clear liquids (apple juice, ginger ale, 7-up, broth, etc.), and progress to your regular diet as you feel able. It is important to stay well-hydrated after surgery and drink plenty of water.     Shower/Bathing - No restrictions, may shower after 24 hours    Shower/Bathing - No restrictions, may shower after 24 hours.     Dressing / Wound Care - Wound    You have a clean dressing on your surgical wound. Dressing change instructions as follows: You may remove the Band-Aids in 24 hours.  The white stickers will remain in place for 2 to 3 weeks.  Contact your Surgeon Team if you have increased redness, warmth around the surgical wound, and/or drainage from the surgical wound.     Incision Care    Keep dressing clean and dry, change as instructed by Provider or RN. Wash your hands with soap and warm water before you touch the site of surgery. If you have skin tapes (steri-strips) on your surgical site, leave them on the surgical site until they fall off on their own (typically 7-10 days). If you have stitches under the skin, they will dissolve, or your doctor will give you instructions on when to return to their office for removal. Unless directed otherwise, remove dressing, if present, the day after surgery and leave open to air. If Dermabond (a type of skin glue) is present, leave in place until it wears/flakes off (2-3 weeks).     Ice to affected area    Ice to operative site PRN     Discharge Instructions - Comfort and Pain Management    Pain after surgery is normal and expected. You will have some amount of pain after surgery. Your pain will improve with time. There are several things you can do to help reduce your pain including: rest, ice, and using pain medications as needed. Use pain interventions and don't wait until pain level  is out of control. Contact your Surgeon Team if you have pain that persists or worsens after surgery despite rest, ice, and taking your medication(s) as prescribed. You may have a dry mouth, a sore throat, muscles aches or trouble sleeping, and these symptoms should go away after 24 hours.     Discharge Instructions - Rest    Rest and relax for the next 24 hours. Make arrangements to have someone stay with you overnight, and avoid hazardous and strenuous activities.  Do NOT make any important decisions for the next 24 hours.     No lifting    No lifting over 20 pounds and no strenuous physical activity for 2 weeks.     May return to work (WITH restrictions)    May return to work in 1-2 week(s) with the following restrictions: No lifting over 20 pounds, no strenuous activities for total of 2 weeks.       Significant Results and Procedures   Most Recent 3 CBC's:  Recent Labs   Lab Test 07/20/24  0559 05/21/23  1311 05/10/23  0936 04/20/23  1329 10/18/22  0204   WBC 10.9  --   --  10.7 9.8   HGB 12.3  --  10.8* 10.6* 12.2   MCV 86  --   --  88 92    248  --  271 305     Most Recent 3 BMP's:  Recent Labs   Lab Test 07/20/24  0559 05/21/23  1311 04/20/23  1329     --  131*   POTASSIUM 4.1  --  3.7   CHLORIDE 105  --  99   CO2 26  --  19*   BUN 9.4  --  5.3*   CR 0.72 0.44* 0.51   ANIONGAP 7  --  13   BESSIE 8.9  --  9.6   *  --  93   ,   Results for orders placed or performed in visit on 09/04/22   XR Ankle Right G/E 3 Views    Narrative    EXAM: XR ANKLE RIGHT G/E 3 VIEWS  LOCATION: [facility]  DATE/TIME: 9/4/2022 11:25 AM    INDICATION:  Injury of right ankle, initial encounter  COMPARISON: None.      Impression    IMPRESSION: Lateral soft tissue swelling. No fractures are identified. The ankle mortise is intact. The talar dome is unremarkable.       Discharge Medications   Current Discharge Medication List        START taking these medications    Details   docusate sodium  (COLACE) 100 MG capsule Take 1 capsule (100 mg) by mouth 2 times daily  Qty: 30 capsule, Refills: 0    Associated Diagnoses: Acute appendicitis with generalized peritonitis without gangrene, perforation, or abscess      HYDROcodone-acetaminophen (NORCO) 5-325 MG tablet Take 1-2 tablets by mouth every 4 hours as needed for moderate to severe pain  Qty: 10 tablet, Refills: 0    Associated Diagnoses: Acute appendicitis with generalized peritonitis without gangrene, perforation, or abscess           CONTINUE these medications which have NOT CHANGED    Details   citalopram (CELEXA) 20 MG tablet TAKE 1 TABLET(20 MG) BY MOUTH DAILY  Qty: 90 tablet, Refills: 1    Associated Diagnoses: INGE (generalized anxiety disorder); Depressive disorder           Allergies   Allergies   Allergen Reactions    Latex Hives     Precaution      47 yo M PMH cirrhosis presented to ED with several bouts of hematemesis and Hgb 6.5. CTAP showed inferior esophageal wall thickening. RRT called during EGD for worsening hematemesis requiring intubation and urgent variceal banding. Pt was upgraded to ICU for closer monitoring and is now extubated with removal of femoral line. No further episodes of bleeding.

## 2024-09-27 NOTE — CHART NOTE - NSCHARTNOTEFT_GEN_A_CORE
Patient seen and examined at bedside for removal of  right radial arterial line. Chart and labs reviewed prior to removal, no contraindication to procedure. Area cleaned with chlorhexidine swabs, suture removal kit used to remove sutures holding arterial line in place using aseptic technique and A-line removed. Pressure applied for 5-10 minutes with gauze. No signs of active bleeding noted. No hematoma formation noted. Tegaderm and gauze/tape used to create pressure dressing to maintain pressure on Arterial line site. Patient tolerated well without complications.       Discussed procedure with RN who will monitor and notify the provider for bleeding, hematoma formation, or other procedural complications.

## 2024-09-28 LAB
ALBUMIN SERPL ELPH-MCNC: 3.1 G/DL — LOW (ref 3.5–5)
ALP SERPL-CCNC: 76 U/L — SIGNIFICANT CHANGE UP (ref 40–120)
ALT FLD-CCNC: 28 U/L DA — SIGNIFICANT CHANGE UP (ref 10–60)
ANION GAP SERPL CALC-SCNC: 8 MMOL/L — SIGNIFICANT CHANGE UP (ref 5–17)
ANISOCYTOSIS BLD QL: SLIGHT — SIGNIFICANT CHANGE UP
AST SERPL-CCNC: 14 U/L — SIGNIFICANT CHANGE UP (ref 10–40)
BASOPHILS # BLD AUTO: 0 K/UL — SIGNIFICANT CHANGE UP (ref 0–0.2)
BASOPHILS NFR BLD AUTO: 0 % — SIGNIFICANT CHANGE UP (ref 0–2)
BILIRUB SERPL-MCNC: 0.9 MG/DL — SIGNIFICANT CHANGE UP (ref 0.2–1.2)
BUN SERPL-MCNC: 11 MG/DL — SIGNIFICANT CHANGE UP (ref 7–18)
CALCIUM SERPL-MCNC: 8.5 MG/DL — SIGNIFICANT CHANGE UP (ref 8.4–10.5)
CHLORIDE SERPL-SCNC: 112 MMOL/L — HIGH (ref 96–108)
CO2 SERPL-SCNC: 24 MMOL/L — SIGNIFICANT CHANGE UP (ref 22–31)
CREAT SERPL-MCNC: 0.87 MG/DL — SIGNIFICANT CHANGE UP (ref 0.5–1.3)
EGFR: 107 ML/MIN/1.73M2 — SIGNIFICANT CHANGE UP
EOSINOPHIL # BLD AUTO: 0.17 K/UL — SIGNIFICANT CHANGE UP (ref 0–0.5)
EOSINOPHIL NFR BLD AUTO: 6.3 % — HIGH (ref 0–6)
GLUCOSE SERPL-MCNC: 107 MG/DL — HIGH (ref 70–99)
HCT VFR BLD CALC: 23.3 % — LOW (ref 39–50)
HCT VFR BLD CALC: 26.1 % — LOW (ref 39–50)
HGB BLD-MCNC: 7.9 G/DL — LOW (ref 13–17)
HGB BLD-MCNC: 8.7 G/DL — LOW (ref 13–17)
HYPOCHROMIA BLD QL: SIGNIFICANT CHANGE UP
IMM GRANULOCYTES NFR BLD AUTO: 0.4 % — SIGNIFICANT CHANGE UP (ref 0–0.9)
LYMPHOCYTES # BLD AUTO: 0.49 K/UL — LOW (ref 1–3.3)
LYMPHOCYTES # BLD AUTO: 18.3 % — SIGNIFICANT CHANGE UP (ref 13–44)
MAGNESIUM SERPL-MCNC: 1.7 MG/DL — SIGNIFICANT CHANGE UP (ref 1.6–2.6)
MANUAL SMEAR VERIFICATION: SIGNIFICANT CHANGE UP
MCHC RBC-ENTMCNC: 27 PG — SIGNIFICANT CHANGE UP (ref 27–34)
MCHC RBC-ENTMCNC: 27.4 PG — SIGNIFICANT CHANGE UP (ref 27–34)
MCHC RBC-ENTMCNC: 33.3 GM/DL — SIGNIFICANT CHANGE UP (ref 32–36)
MCHC RBC-ENTMCNC: 33.9 GM/DL — SIGNIFICANT CHANGE UP (ref 32–36)
MCV RBC AUTO: 80.9 FL — SIGNIFICANT CHANGE UP (ref 80–100)
MCV RBC AUTO: 81.1 FL — SIGNIFICANT CHANGE UP (ref 80–100)
MONOCYTES # BLD AUTO: 0.23 K/UL — SIGNIFICANT CHANGE UP (ref 0–0.9)
MONOCYTES NFR BLD AUTO: 8.6 % — SIGNIFICANT CHANGE UP (ref 2–14)
NEUTROPHILS # BLD AUTO: 1.78 K/UL — LOW (ref 1.8–7.4)
NEUTROPHILS NFR BLD AUTO: 66.4 % — SIGNIFICANT CHANGE UP (ref 43–77)
NRBC # BLD: 0 /100 WBCS — SIGNIFICANT CHANGE UP (ref 0–0)
NRBC # BLD: 0 /100 WBCS — SIGNIFICANT CHANGE UP (ref 0–0)
OVALOCYTES BLD QL SMEAR: SLIGHT — SIGNIFICANT CHANGE UP
PHOSPHATE SERPL-MCNC: 3.7 MG/DL — SIGNIFICANT CHANGE UP (ref 2.5–4.5)
PLAT MORPH BLD: NORMAL — SIGNIFICANT CHANGE UP
PLATELET # BLD AUTO: 47 K/UL — LOW (ref 150–400)
PLATELET # BLD AUTO: 48 K/UL — LOW (ref 150–400)
PLATELET COUNT - ESTIMATE: ABNORMAL
POTASSIUM SERPL-MCNC: 3.5 MMOL/L — SIGNIFICANT CHANGE UP (ref 3.5–5.3)
POTASSIUM SERPL-SCNC: 3.5 MMOL/L — SIGNIFICANT CHANGE UP (ref 3.5–5.3)
PROT SERPL-MCNC: 6 G/DL — SIGNIFICANT CHANGE UP (ref 6–8.3)
RBC # BLD: 2.88 M/UL — LOW (ref 4.2–5.8)
RBC # BLD: 3.22 M/UL — LOW (ref 4.2–5.8)
RBC # FLD: 17 % — HIGH (ref 10.3–14.5)
RBC # FLD: 17.1 % — HIGH (ref 10.3–14.5)
RBC BLD AUTO: ABNORMAL
SODIUM SERPL-SCNC: 144 MMOL/L — SIGNIFICANT CHANGE UP (ref 135–145)
STOMATOCYTES BLD QL SMEAR: SIGNIFICANT CHANGE UP
WBC # BLD: 2.68 K/UL — LOW (ref 3.8–10.5)
WBC # BLD: 2.86 K/UL — LOW (ref 3.8–10.5)
WBC # FLD AUTO: 2.68 K/UL — LOW (ref 3.8–10.5)
WBC # FLD AUTO: 2.86 K/UL — LOW (ref 3.8–10.5)

## 2024-09-28 RX ORDER — SODIUM CHLORIDE IRRIG SOLUTION 0.9 %
500 SOLUTION, IRRIGATION IRRIGATION ONCE
Refills: 0 | Status: DISCONTINUED | OUTPATIENT
Start: 2024-09-28 | End: 2024-09-28

## 2024-09-28 RX ADMIN — PANTOPRAZOLE SODIUM 40 MILLIGRAM(S): 40 TABLET, DELAYED RELEASE ORAL at 17:37

## 2024-09-28 RX ADMIN — PANTOPRAZOLE SODIUM 40 MILLIGRAM(S): 40 TABLET, DELAYED RELEASE ORAL at 05:39

## 2024-09-28 RX ADMIN — FOLIC ACID 1 MILLIGRAM(S): 1 TABLET ORAL at 11:39

## 2024-09-28 RX ADMIN — PIPERACILLIN SODIUM AND TAZOBACTAM SODIUM 25 GRAM(S): 12; 1.5 INJECTION, POWDER, LYOPHILIZED, FOR SOLUTION INTRAVENOUS at 14:34

## 2024-09-28 RX ADMIN — PIPERACILLIN SODIUM AND TAZOBACTAM SODIUM 25 GRAM(S): 12; 1.5 INJECTION, POWDER, LYOPHILIZED, FOR SOLUTION INTRAVENOUS at 05:34

## 2024-09-28 RX ADMIN — PIPERACILLIN SODIUM AND TAZOBACTAM SODIUM 25 GRAM(S): 12; 1.5 INJECTION, POWDER, LYOPHILIZED, FOR SOLUTION INTRAVENOUS at 21:19

## 2024-09-28 RX ADMIN — THIAMINE HYDROCHLORIDE 105 MILLIGRAM(S): 100 INJECTION, SOLUTION INTRAMUSCULAR; INTRAVENOUS at 05:35

## 2024-09-28 RX ADMIN — CHLORHEXIDINE GLUCONATE ORAL RINSE 1 APPLICATION(S): 1.2 SOLUTION DENTAL at 05:38

## 2024-09-28 RX ADMIN — THIAMINE HYDROCHLORIDE 105 MILLIGRAM(S): 100 INJECTION, SOLUTION INTRAMUSCULAR; INTRAVENOUS at 17:38

## 2024-09-28 NOTE — PROGRESS NOTE ADULT - ASSESSMENT
Assessment:  48-year-old male with pmhx of cirrhosis and right knee pain presenting with hematemesis. Patient was in endoscopy suite getting EGD RRT called for perfuse hematemesis. Patient was intubated. EGD performed, banded. MTP called received 2PRBC and 1FFP Cordis placed, arterial line placed. Patient transferred to ICU for variceal bleed, H/H stable    Plan:  Neuro:  #no issues    Cardiovascular:  #No issues     Pulmonary:   #AHRF 2/2 GIB, resolved  - s/p intubated for airway protection on 9/25, extubated on 9/26  - satting well on RA    Infectious Diseases:  #concern for sepsis   - concern for SBP vs aspiration PNA vs ?infection from cordis with hx of cirrhosis, profuse bleeding from varices during EGD  - temp: 100.2 at AM, WBC: 3.54 on 9/26, cordis removed on 9/26  - afebrile on 9/27, right radial arterial line removed on 9/27  - sputum Cx: neg  - f/u BCx  - c/w zosyn 3.375g q8h (9/25 - )  - ID Dr. 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  p/w hematemesis, was taking ibuprofen every other day for 1 week before ED admission for right knee pain  CT angio A/P: wall thickening along inferior esophagus, underlying esophageal tear cannot be excluded, marked splenomegaly, stomach distended with fluid and blood products  EGD: esophagus - bright red blood, active arterial bleed visualized at distal esophagus   Initial Hb: 6.5> 1PRBC, 1PLT> 7.2> 2PRBC, 2FFP, 2PLT > 6.7 > 1PRBC > 7.1 > 7.2 >7.4>7.5>7.6 - H/H stable  MELD score: 9  s/p EGD w/ banding x4 on 9/25  c/w octreotide drip for 72 h (9/25 - 9/27) - plan to d/c octreotide on 9/28  c/w PPI IV BID, thiamine and folic acid  Advance diet to clears. On Saturday and Sunday advance to soft diet. Start regular diet after Monday  GI following  consulted hepatology Dr. Maoy    Renal:  #Hypernatremia, resolved  2/2 poor oral intake    #TOV on 9/27    Heme/onc:   #Thrombocytopenia, leukopenia   #Anemia 2/2 blood loss  2/2 cirrhosis   s/p 4PRBC, 3 PLT, 2 FFP   H/H stable  Maintain active type and screen   Transfuse for Hb < 7   Trend CBC q12h    Endo:   #No issues     Skin/ catheter:   #Peripheral IVs     Prophylaxis:   #DVT ppx SCD    Goals of Care: Full code     Dispo: ICU    Assessment:  48-year-old male with pmhx of cirrhosis and right knee pain presenting with hematemesis. Patient was in endoscopy suite getting EGD RRT called for perfuse hematemesis. Patient was intubated. EGD performed, banded. MTP called received 2PRBC and 1FFP Cordis placed, arterial line placed. Patient transferred to ICU for variceal bleed. Pt has since been stable H/H, downgrade from ICU.    Plan:  Neuro:  #no issues    Cardiovascular:  #No issues     Pulmonary:   #AHRF 2/2 GIB, resolved  - s/p intubated for airway protection on 9/25, extubated on 9/26  - satting well on RA    ENT:  - Pt with healing trauma of oropharynx 2/2 intubation and EGD  - Monitor for signs of infection    Infectious Diseases:  #concern for sepsis, resolved  - concern for SBP vs aspiration PNA vs ?infection from cordis with hx of cirrhosis, profuse bleeding from varices during EGD  - temp: 100.2 at AM, WBC: 3.54 on 9/26, cordis removed on 9/26  - afebrile on 9/27, right radial arterial line removed on 9/27  - sputum Cx: neg  - f/u BCx  - c/w zosyn 3.375g q8h (9/25 - )  - ID Dr. 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  #Splenomegaly, out of proportion to cirrhosis  p/w hematemesis, was taking ibuprofen every other day for 1 week before ED admission for right knee pain  CT angio A/P: wall thickening along inferior esophagus, underlying esophageal tear cannot be excluded, marked splenomegaly, stomach distended with fluid and blood products  EGD: esophagus - bright red blood, active arterial bleed visualized at distal esophagus   Initial Hb: 6.5> 1PRBC, 1PLT> 7.2> 2PRBC, 2FFP, 2PLT > 6.7 > 1PRBC > 7.1 > 7.2 >7.4>7.5>7.6 - H/H stable  MELD score: 9  s/p EGD w/ banding x4 on 9/25  s/p octreotide drip for 72 h (9/25 - 9/27) - plan to d/c octreotide on 9/28  c/w PPI IV BID, thiamine and folic acid  Soft diet; Start regular diet after Monday  GI following  consulted hepatology Dr. Mayo  - F/u hepatology and heme/onc for splenomegaly w/u    Renal:  #Hypernatremia, resolved  2/2 poor oral intake      Heme/onc:   #Thrombocytopenia, leukopenia   #Anemia 2/2 blood loss  #Splenomegaly, out of proportion to cirrhosis  2/2 cirrhosis   s/p 4PRBC, 3 PLT, 2 FFP   H/H stable  Maintain active type and screen   Transfuse for Hb < 7   Trend CBC q12h  - F/u hepatology and heme/onc for splenomegaly w/u    Endo:   #No issues     Skin/ catheter:   #Peripheral IVs     Prophylaxis:   #DVT ppx SCD    Goals of Care: Full code     Dispo: Downgrade from ICU

## 2024-09-28 NOTE — CHART NOTE - NSCHARTNOTEFT_GEN_A_CORE
48-year-old male with pmhx of cirrhosis and right knee pain presenting with hematemesis. Patient recieved 1PRBC and 1U PLT in the ED. CT angio A/P: wall thickening along inferior esophagus, underlying esophageal tear cannot be excluded, marked splenomegaly, stomach distended with fluid and blood products. Patient was in endoscopy suite getting EGD and RRT called for perfuse hematemesis. Patient was intubated. EGD performed, banded x4. MTP called received 2PRBC and 1FFP Cordis placed, arterial line placed. Patient transferred to ICU for variceal bleed. Patient was started empirically on zosyn, PPI IV BID and on octeotride for 3 days total. Patient was spiking a low grade fever; sputum neg. Pt was started on zosyn empirically. H/H has since been stable. Patient was advanced to clear diet, and then advanced to soft diet. Pt was noted to have injury in pharynx secondary to intubation and EGD trauma. Pt improved and stable for downgrade from ICU.     Things to follow:  - f/u BCx  - f/u ID about abx course  - f/u hepatology  - trend CBC q12h 48-year-old male with pmhx of cirrhosis and right knee pain presenting with hematemesis. Patient recieved 1PRBC and 1U PLT in the ED. CT angio A/P: wall thickening along inferior esophagus, underlying esophageal tear cannot be excluded, marked splenomegaly, stomach distended with fluid and blood products. Patient was in endoscopy suite getting EGD and RRT called for perfuse hematemesis. Patient was intubated. EGD performed, banded x4. MTP called received 2PRBC and 1FFP Cordis placed, arterial line placed. Patient transferred to ICU for variceal bleed. Patient was started empirically on zosyn, PPI IV BID and on octeotride for 3 days total. Patient was spiking a low grade fever; sputum neg. Pt was started on zosyn empirically. H/H has since been stable. Patient was advanced to clear diet, and then advanced to soft diet. Pt was noted to have injury in pharynx secondary to intubation and EGD trauma. Pt improved and stable for downgrade from ICU.     Things to follow:  - f/u BCx  - f/u ID about abx course  - f/u hepatology and hemonc for splenomegaly workup  - trend CBC q12h 48-year-old male with pmhx of cirrhosis and right knee pain presenting with hematemesis. Patient recieved 1PRBC and 1U PLT in the ED. CT angio A/P: wall thickening along inferior esophagus, underlying esophageal tear cannot be excluded, marked splenomegaly, stomach distended with fluid and blood products. Patient was in endoscopy suite getting EGD and RRT called for perfuse hematemesis. Patient was intubated. EGD performed, banded x4. MTP called received 2PRBC and 1FFP Cordis placed, arterial line placed. Patient transferred to ICU for variceal bleed. Patient was started empirically on zosyn, PPI IV BID and on octeotride for 3 days total. Patient was spiking a low grade fever; sputum neg. Pt was started on zosyn empirically. H/H has since been stable. Patient was advanced to clear diet, and then advanced to soft diet. Pt was noted to have injury in pharynx secondary to intubation and EGD trauma. Pt improved and stable for downgrade from ICU.     Signout to PGY-1 Dr. Mal Smith and attending Dr Dumont to follow:  - f/u BCx  - f/u ID about abx course  - f/u hepatology and hemonc for splenomegaly workup  - trend CBC q12h 48-year-old male with pmhx of cirrhosis and right knee pain presenting with hematemesis. Patient received 1PRBC and 1U PLT in the ED. CT angio A/P: wall thickening along inferior esophagus, underlying esophageal tear cannot be excluded, marked splenomegaly, stomach distended with fluid and blood products. Patient was in endoscopy suite getting EGD and RRT called for perfuse hematemesis. Patient was intubated. EGD performed, banded x4. MTP called received 2PRBC and 1FFP Cordis placed, arterial line placed. Patient transferred to ICU for variceal bleed. Patient was started empirically on zosyn, PPI IV BID and on octeotride for 3 days total. Patient was spiking a low grade fever; sputum neg. Pt was started on zosyn empirically. H/H has since been stable. Patient was advanced to clear diet, and then advanced to soft diet. Pt was noted to have injury in pharynx secondary to intubation and EGD trauma. Pt improved and stable for downgrade from ICU.     Signout to PGY-1 Dr. Mal Smith and attending Dr. Jaramillo.    Things to follow:  - f/u BCx  - f/u ID about abx course  - f/u hepatology and hemonc for splenomegaly workup  - trend CBC q12h

## 2024-09-28 NOTE — PROGRESS NOTE ADULT - SUBJECTIVE AND OBJECTIVE BOX
Reason for Admission:  · Reason for Admission	GI bleed      · Subjective and Objective:   Patient is a 48y old  Male who presents with a chief complaint of GI bleed (27 Sep 2024 11:21)      INTERVAL HPI/OVERNIGHT EVENTS: No overnight events. Patient was examined in the morning at bedside and he is in NAD. Pt reports he has had some minimal throat discomfort when talking.      ICU Vital Signs Last 24 Hrs  T(C): 36.8 (28 Sep 2024 00:00), Max: 37.8 (27 Sep 2024 02:00)  T(F): 98.3 (28 Sep 2024 00:00), Max: 100 (27 Sep 2024 02:00)  HR: 61 (28 Sep 2024 00:00) (59 - 94)  BP: 116/65 (28 Sep 2024 00:00) (107/70 - 127/82)  BP(mean): 80 (28 Sep 2024 00:00) (76 - 94)  ABP: 112/66 (27 Sep 2024 10:00) (91/79 - 131/76)  ABP(mean): 85 (27 Sep 2024 10:00) (79 - 98)  RR: 12 (28 Sep 2024 00:00) (12 - 18)  SpO2: 98% (28 Sep 2024 00:00) (94% - 100%)    O2 Parameters below as of 28 Sep 2024 00:00  Patient On (Oxygen Delivery Method): room air          I&O's Summary    26 Sep 2024 07:01  -  27 Sep 2024 07:00  --------------------------------------------------------  IN: 1171.7 mL / OUT: 1955 mL / NET: -783.3 mL    27 Sep 2024 07:01  -  28 Sep 2024 00:14  --------------------------------------------------------  IN: 750 mL / OUT: 2050 mL / NET: -1300 mL              PRESSORS: [ ] YES [x] NO    Antimicrobial:  piperacillin/tazobactam IVPB.. 3.375 Gram(s) IV Intermittent every 8 hours    Cardiovascular:    Pulmonary:    Hematalogic:    Other:  chlorhexidine 2% Cloths 1 Application(s) Topical <User Schedule>  folic acid 1 milliGRAM(s) Oral daily  influenza   Vaccine 0.5 milliLiter(s) IntraMuscular once  octreotide  Infusion 50 MICROgram(s)/Hr IV Continuous <Continuous>  ondansetron Injectable 4 milliGRAM(s) IV Push every 8 hours PRN  pantoprazole  Injectable 40 milliGRAM(s) IV Push two times a day  thiamine IVPB 500 milliGRAM(s) IV Intermittent every 8 hours    chlorhexidine 2% Cloths 1 Application(s) Topical <User Schedule>  folic acid 1 milliGRAM(s) Oral daily  influenza   Vaccine 0.5 milliLiter(s) IntraMuscular once  octreotide  Infusion 50 MICROgram(s)/Hr IV Continuous <Continuous>  ondansetron Injectable 4 milliGRAM(s) IV Push every 8 hours PRN  pantoprazole  Injectable 40 milliGRAM(s) IV Push two times a day  piperacillin/tazobactam IVPB.. 3.375 Gram(s) IV Intermittent every 8 hours  thiamine IVPB 500 milliGRAM(s) IV Intermittent every 8 hours    Drug Dosing Weight  Height (cm): 155 (25 Sep 2024 11:44)  Weight (kg): 80.9 (25 Sep 2024 15:38)  BMI (kg/m2): 33.7 (25 Sep 2024 15:38)  BSA (m2): 1.8 (25 Sep 2024 15:38)    QUINTERO: [ ] YES [ ] NO    DATE INSERTED:    CENTRAL LINE: [ ] YES [ ] NO  LOCATION:   DATE INSERTED:    A-LINE:  [ ] YES [ ] NO  LOCATION:   DATE INSERTED:      [facility] -reviewed admission note, no change since admission  PAST MEDICAL & SURGICAL HISTORY:  Cirrhosis              PHYSICAL EXAM:  GENERAL: NAD,   HEAD:  Atraumatic, Normocephalic  EYES: EOMI, PERRLA, conjunctiva and sclera clear  ENMT: White healing tissue of oropharynx  NECK: Supple, normal appearance,   NERVOUS SYSTEM:  Alert & Oriented X3, No Asterixis  CHEST/LUNG: No chest deformity; No rales, rhonchi, wheezing   HEART: Regular rate and rhythm; No murmurs,  ABDOMEN: Soft, Nontender, non distended; Bowel sounds present  EXTREMITIES:  2+ Peripheral Pulses, No clubbing, cyanosis, or edema  SKIN: No rashes or lesions;      LABS:  CBC Full  -  ( 27 Sep 2024 18:34 )  WBC Count : 4.24 K/uL  RBC Count : 3.24 M/uL  Hemoglobin : 8.8 g/dL  Hematocrit : 26.7 %  Platelet Count - Automated : 52 K/uL  Mean Cell Volume : 82.4 fl  Mean Cell Hemoglobin : 27.2 pg  Mean Cell Hemoglobin Concentration : 33.0 gm/dL  Auto Neutrophil # : 3.10 K/uL  Auto Lymphocyte # : 0.54 K/uL  Auto Monocyte # : 0.40 K/uL  Auto Eosinophil # : 0.18 K/uL  Auto Basophil # : 0.01 K/uL  Auto Neutrophil % : 73.3 %  Auto Lymphocyte % : 12.7 %  Auto Monocyte % : 9.4 %  Auto Eosinophil % : 4.2 %  Auto Basophil % : 0.2 %    09-27    141  |  112[H]  |  15  ----------------------------<  97  3.3[L]   |  21[L]  |  0.89    Ca    8.2[L]      27 Sep 2024 03:13  Phos  3.5     09-27  Mg     1.6     09-27    TPro  5.8[L]  /  Alb  2.9[L]  /  TBili  0.9  /  DBili  x   /  AST  14  /  ALT  30  /  AlkPhos  71  09-27    PT/INR - ( 26 Sep 2024 03:40 )   PT: 14.5 sec;   INR: 1.25 ratio           Urinalysis Basic - ( 27 Sep 2024 03:13 )    Color: x / Appearance: x / SG: x / pH: x  Gluc: 97 mg/dL / Ketone: x  / Bili: x / Urobili: x   Blood: x / Protein: x / Nitrite: x   Leuk Esterase: x / RBC: x / WBC x   Sq Epi: x / Non Sq Epi: x / Bacteria: x      Culture Results:   No growth at 24 hours (09-26 @ 10:00)  Culture Results:   No growth at 24 hours (09-26 @ 09:45)  Culture Results:   Normal Respiratory Nano present (09-26 @ 09:40)        CRITICAL CARE TIME SPENT: 35 minutes      Assessment and Plan:   · Assessment	  Assessment:  48-year-old male with pmhx of cirrhosis and right knee pain presenting with hematemesis. Patient was in endoscopy suite getting EGD RRT called for perfuse hematemesis. Patient was intubated. EGD performed, banded. MTP called received 2PRBC and 1FFP Cordis placed, arterial line placed. Patient transferred to ICU for variceal bleed. Pt has since been stable H/H, downgrade from ICU.    Plan:  Neuro:  #no issues    Cardiovascular:  #No issues     Pulmonary:   #AHRF 2/2 GIB, resolved  - s/p intubated for airway protection on 9/25, extubated on 9/26  - satting well on RA    ENT:  - Pt with healing trauma of oropharynx 2/2 intubation and EGD  - Monitor for signs of infection    Infectious Diseases:  #concern for sepsis, resolved  - concern for SBP vs aspiration PNA vs ?infection from cordis with hx of cirrhosis, profuse bleeding from varices during EGD  - temp: 100.2 at AM, WBC: 3.54 on 9/26, cordis removed on 9/26  - afebrile on 9/27, right radial arterial line removed on 9/27  - sputum Cx: neg  - f/u BCx  - c/w zosyn 3.375g q8h (9/25 - )  - ID Dr. 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  #Splenomegaly, out of proportion to cirrhosis  p/w hematemesis, was taking ibuprofen every other day for 1 week before ED admission for right knee pain  CT angio A/P: wall thickening along inferior esophagus, underlying esophageal tear cannot be excluded, marked splenomegaly, stomach distended with fluid and blood products  EGD: esophagus - bright red blood, active arterial bleed visualized at distal esophagus   Initial Hb: 6.5> 1PRBC, 1PLT> 7.2> 2PRBC, 2FFP, 2PLT > 6.7 > 1PRBC > 7.1 > 7.2 >7.4>7.5>7.6 - H/H stable  MELD score: 9  s/p EGD w/ banding x4 on 9/25  s/p octreotide drip for 72 h (9/25 - 9/27) - plan to d/c octreotide on 9/28  c/w PPI IV BID, thiamine and folic acid  Soft diet; Start regular diet after Monday  GI following  consulted hepatology Dr. Mayo  - F/u hepatology and heme/onc for splenomegaly w/u    Renal:  #Hypernatremia, resolved  2/2 poor oral intake      Heme/onc:   #Thrombocytopenia, leukopenia   #Anemia 2/2 blood loss  #Splenomegaly, out of proportion to cirrhosis  2/2 cirrhosis   s/p 4PRBC, 3 PLT, 2 FFP   H/H stable  Maintain active type and screen   Transfuse for Hb < 7   Trend CBC q12h  - F/u hepatology and heme/onc for splenomegaly w/u    Endo:   #No issues     Skin/ catheter:   #Peripheral IVs     Prophylaxis:   #DVT ppx SCD    Goals of Care: Full code     Dispo: Downgrade from ICU

## 2024-09-28 NOTE — PROGRESS NOTE ADULT - SUBJECTIVE AND OBJECTIVE BOX
Patient is a 48y old  Male who presents with a chief complaint of GI bleed (27 Sep 2024 11:21)      INTERVAL HPI/OVERNIGHT EVENTS: No overnight events. Patient was examined in the morning at bedside and he is in NAD. Patient has no complaint at the moment       REVIEW OF SYSTEMS:  CONSTITUTIONAL: No fever, chills  RESPIRATORY: No cough, wheezing, shortness of breath  CARDIOVASCULAR: No chest pain, palpitations,   GASTROINTESTINAL: No abdominal pain, nausea, vomiting, diarrhea or constipation, bloody stool  GENITOURINARY: No dysuria,  hematuria, urinary frequency  NEUROLOGICAL: No headaches, numbness, or tremors  EXTREMITES: No leg swelling  SKIN: No itching, burning, rashes, or lesions     ICU Vital Signs Last 24 Hrs  T(C): 36.8 (28 Sep 2024 00:00), Max: 37.8 (27 Sep 2024 02:00)  T(F): 98.3 (28 Sep 2024 00:00), Max: 100 (27 Sep 2024 02:00)  HR: 61 (28 Sep 2024 00:00) (59 - 94)  BP: 116/65 (28 Sep 2024 00:00) (107/70 - 127/82)  BP(mean): 80 (28 Sep 2024 00:00) (76 - 94)  ABP: 112/66 (27 Sep 2024 10:00) (91/79 - 131/76)  ABP(mean): 85 (27 Sep 2024 10:00) (79 - 98)  RR: 12 (28 Sep 2024 00:00) (12 - 18)  SpO2: 98% (28 Sep 2024 00:00) (94% - 100%)    O2 Parameters below as of 28 Sep 2024 00:00  Patient On (Oxygen Delivery Method): room air          I&O's Summary    26 Sep 2024 07:01  -  27 Sep 2024 07:00  --------------------------------------------------------  IN: 1171.7 mL / OUT: 1955 mL / NET: -783.3 mL    27 Sep 2024 07:01  -  28 Sep 2024 00:14  --------------------------------------------------------  IN: 750 mL / OUT: 2050 mL / NET: -1300 mL              PRESSORS: [ ] YES [ ] NO    Antimicrobial:  piperacillin/tazobactam IVPB.. 3.375 Gram(s) IV Intermittent every 8 hours    Cardiovascular:    Pulmonary:    Hematalogic:    Other:  chlorhexidine 2% Cloths 1 Application(s) Topical <User Schedule>  folic acid 1 milliGRAM(s) Oral daily  influenza   Vaccine 0.5 milliLiter(s) IntraMuscular once  octreotide  Infusion 50 MICROgram(s)/Hr IV Continuous <Continuous>  ondansetron Injectable 4 milliGRAM(s) IV Push every 8 hours PRN  pantoprazole  Injectable 40 milliGRAM(s) IV Push two times a day  thiamine IVPB 500 milliGRAM(s) IV Intermittent every 8 hours    chlorhexidine 2% Cloths 1 Application(s) Topical <User Schedule>  folic acid 1 milliGRAM(s) Oral daily  influenza   Vaccine 0.5 milliLiter(s) IntraMuscular once  octreotide  Infusion 50 MICROgram(s)/Hr IV Continuous <Continuous>  ondansetron Injectable 4 milliGRAM(s) IV Push every 8 hours PRN  pantoprazole  Injectable 40 milliGRAM(s) IV Push two times a day  piperacillin/tazobactam IVPB.. 3.375 Gram(s) IV Intermittent every 8 hours  thiamine IVPB 500 milliGRAM(s) IV Intermittent every 8 hours    Drug Dosing Weight  Height (cm): 155 (25 Sep 2024 11:44)  Weight (kg): 80.9 (25 Sep 2024 15:38)  BMI (kg/m2): 33.7 (25 Sep 2024 15:38)  BSA (m2): 1.8 (25 Sep 2024 15:38)    LEON: [ ] YES [ ] NO    DATE INSERTED:    CENTRAL LINE: [ ] YES [ ] NO  LOCATION:   DATE INSERTED:    A-LINE:  [ ] YES [ ] NO  LOCATION:   DATE INSERTED:      [facility] -reviewed admission note, no change since admission  PAST MEDICAL & SURGICAL HISTORY:  Cirrhosis              PHYSICAL EXAM:  GENERAL: NAD,   HEAD:  Atraumatic, Normocephalic  EYES: EOMI, PERRLA, conjunctiva and sclera clear  ENMT: Moist mucous membranes, No lesions  NECK: Supple, normal appearance,   NERVOUS SYSTEM:  Alert & Oriented X3, No Asterixis  CHEST/LUNG: No chest deformity; No rales, rhonchi, wheezing   HEART: Regular rate and rhythm; No murmurs,  ABDOMEN: Soft, Nontender, Nondistended; Bowel sounds present  EXTREMITIES:  2+ Peripheral Pulses, No clubbing, cyanosis, or edema  SKIN: No rashes or lesions;      LABS:  CBC Full  -  ( 27 Sep 2024 18:34 )  WBC Count : 4.24 K/uL  RBC Count : 3.24 M/uL  Hemoglobin : 8.8 g/dL  Hematocrit : 26.7 %  Platelet Count - Automated : 52 K/uL  Mean Cell Volume : 82.4 fl  Mean Cell Hemoglobin : 27.2 pg  Mean Cell Hemoglobin Concentration : 33.0 gm/dL  Auto Neutrophil # : 3.10 K/uL  Auto Lymphocyte # : 0.54 K/uL  Auto Monocyte # : 0.40 K/uL  Auto Eosinophil # : 0.18 K/uL  Auto Basophil # : 0.01 K/uL  Auto Neutrophil % : 73.3 %  Auto Lymphocyte % : 12.7 %  Auto Monocyte % : 9.4 %  Auto Eosinophil % : 4.2 %  Auto Basophil % : 0.2 %    09-27    141  |  112[H]  |  15  ----------------------------<  97  3.3[L]   |  21[L]  |  0.89    Ca    8.2[L]      27 Sep 2024 03:13  Phos  3.5     09-27  Mg     1.6     09-27    TPro  5.8[L]  /  Alb  2.9[L]  /  TBili  0.9  /  DBili  x   /  AST  14  /  ALT  30  /  AlkPhos  71  09-27    PT/INR - ( 26 Sep 2024 03:40 )   PT: 14.5 sec;   INR: 1.25 ratio           Urinalysis Basic - ( 27 Sep 2024 03:13 )    Color: x / Appearance: x / SG: x / pH: x  Gluc: 97 mg/dL / Ketone: x  / Bili: x / Urobili: x   Blood: x / Protein: x / Nitrite: x   Leuk Esterase: x / RBC: x / WBC x   Sq Epi: x / Non Sq Epi: x / Bacteria: x      Culture Results:   No growth at 24 hours (09-26 @ 10:00)  Culture Results:   No growth at 24 hours (09-26 @ 09:45)  Culture Results:   Normal Respiratory Nano present (09-26 @ 09:40)      RADIOLOGY & ADDITIONAL STUDIES REVIEWED:  ***    [ ]GOALS OF CARE DISCUSSION WITH PATIENT/FAMILY/PROXY:    CRITICAL CARE TIME SPENT: 35 minutes Patient is a 48y old  Male who presents with a chief complaint of GI bleed (27 Sep 2024 11:21)      INTERVAL HPI/OVERNIGHT EVENTS: No overnight events. Patient was examined in the morning at bedside and he is in NAD. Pt reports he has had some minimal throat discomfort when talking.      ICU Vital Signs Last 24 Hrs  T(C): 36.8 (28 Sep 2024 00:00), Max: 37.8 (27 Sep 2024 02:00)  T(F): 98.3 (28 Sep 2024 00:00), Max: 100 (27 Sep 2024 02:00)  HR: 61 (28 Sep 2024 00:00) (59 - 94)  BP: 116/65 (28 Sep 2024 00:00) (107/70 - 127/82)  BP(mean): 80 (28 Sep 2024 00:00) (76 - 94)  ABP: 112/66 (27 Sep 2024 10:00) (91/79 - 131/76)  ABP(mean): 85 (27 Sep 2024 10:00) (79 - 98)  RR: 12 (28 Sep 2024 00:00) (12 - 18)  SpO2: 98% (28 Sep 2024 00:00) (94% - 100%)    O2 Parameters below as of 28 Sep 2024 00:00  Patient On (Oxygen Delivery Method): room air          I&O's Summary    26 Sep 2024 07:01  -  27 Sep 2024 07:00  --------------------------------------------------------  IN: 1171.7 mL / OUT: 1955 mL / NET: -783.3 mL    27 Sep 2024 07:01  -  28 Sep 2024 00:14  --------------------------------------------------------  IN: 750 mL / OUT: 2050 mL / NET: -1300 mL              PRESSORS: [ ] YES [x] NO    Antimicrobial:  piperacillin/tazobactam IVPB.. 3.375 Gram(s) IV Intermittent every 8 hours    Cardiovascular:    Pulmonary:    Hematalogic:    Other:  chlorhexidine 2% Cloths 1 Application(s) Topical <User Schedule>  folic acid 1 milliGRAM(s) Oral daily  influenza   Vaccine 0.5 milliLiter(s) IntraMuscular once  octreotide  Infusion 50 MICROgram(s)/Hr IV Continuous <Continuous>  ondansetron Injectable 4 milliGRAM(s) IV Push every 8 hours PRN  pantoprazole  Injectable 40 milliGRAM(s) IV Push two times a day  thiamine IVPB 500 milliGRAM(s) IV Intermittent every 8 hours    chlorhexidine 2% Cloths 1 Application(s) Topical <User Schedule>  folic acid 1 milliGRAM(s) Oral daily  influenza   Vaccine 0.5 milliLiter(s) IntraMuscular once  octreotide  Infusion 50 MICROgram(s)/Hr IV Continuous <Continuous>  ondansetron Injectable 4 milliGRAM(s) IV Push every 8 hours PRN  pantoprazole  Injectable 40 milliGRAM(s) IV Push two times a day  piperacillin/tazobactam IVPB.. 3.375 Gram(s) IV Intermittent every 8 hours  thiamine IVPB 500 milliGRAM(s) IV Intermittent every 8 hours    Drug Dosing Weight  Height (cm): 155 (25 Sep 2024 11:44)  Weight (kg): 80.9 (25 Sep 2024 15:38)  BMI (kg/m2): 33.7 (25 Sep 2024 15:38)  BSA (m2): 1.8 (25 Sep 2024 15:38)    QUINTERO: [ ] YES [ ] NO    DATE INSERTED:    CENTRAL LINE: [ ] YES [ ] NO  LOCATION:   DATE INSERTED:    A-LINE:  [ ] YES [ ] NO  LOCATION:   DATE INSERTED:      [facility] -reviewed admission note, no change since admission  PAST MEDICAL & SURGICAL HISTORY:  Cirrhosis              PHYSICAL EXAM:  GENERAL: NAD,   HEAD:  Atraumatic, Normocephalic  EYES: EOMI, PERRLA, conjunctiva and sclera clear  ENMT: White healing tissue of oropharynx  NECK: Supple, normal appearance,   NERVOUS SYSTEM:  Alert & Oriented X3, No Asterixis  CHEST/LUNG: No chest deformity; No rales, rhonchi, wheezing   HEART: Regular rate and rhythm; No murmurs,  ABDOMEN: Soft, Nontender, non distended; Bowel sounds present  EXTREMITIES:  2+ Peripheral Pulses, No clubbing, cyanosis, or edema  SKIN: No rashes or lesions;      LABS:  CBC Full  -  ( 27 Sep 2024 18:34 )  WBC Count : 4.24 K/uL  RBC Count : 3.24 M/uL  Hemoglobin : 8.8 g/dL  Hematocrit : 26.7 %  Platelet Count - Automated : 52 K/uL  Mean Cell Volume : 82.4 fl  Mean Cell Hemoglobin : 27.2 pg  Mean Cell Hemoglobin Concentration : 33.0 gm/dL  Auto Neutrophil # : 3.10 K/uL  Auto Lymphocyte # : 0.54 K/uL  Auto Monocyte # : 0.40 K/uL  Auto Eosinophil # : 0.18 K/uL  Auto Basophil # : 0.01 K/uL  Auto Neutrophil % : 73.3 %  Auto Lymphocyte % : 12.7 %  Auto Monocyte % : 9.4 %  Auto Eosinophil % : 4.2 %  Auto Basophil % : 0.2 %    09-27    141  |  112[H]  |  15  ----------------------------<  97  3.3[L]   |  21[L]  |  0.89    Ca    8.2[L]      27 Sep 2024 03:13  Phos  3.5     09-27  Mg     1.6     09-27    TPro  5.8[L]  /  Alb  2.9[L]  /  TBili  0.9  /  DBili  x   /  AST  14  /  ALT  30  /  AlkPhos  71  09-27    PT/INR - ( 26 Sep 2024 03:40 )   PT: 14.5 sec;   INR: 1.25 ratio           Urinalysis Basic - ( 27 Sep 2024 03:13 )    Color: x / Appearance: x / SG: x / pH: x  Gluc: 97 mg/dL / Ketone: x  / Bili: x / Urobili: x   Blood: x / Protein: x / Nitrite: x   Leuk Esterase: x / RBC: x / WBC x   Sq Epi: x / Non Sq Epi: x / Bacteria: x      Culture Results:   No growth at 24 hours (09-26 @ 10:00)  Culture Results:   No growth at 24 hours (09-26 @ 09:45)  Culture Results:   Normal Respiratory Nano present (09-26 @ 09:40)        CRITICAL CARE TIME SPENT: 35 minutes

## 2024-09-28 NOTE — PROGRESS NOTE ADULT - ATTENDING COMMENTS
48-year-old male with pmhx of cirrhosis and right knee pain presenting with hematemesis. Patient was in endoscopy suite getting EGD RRT called for perfuse hematemesis. Patient was intubated. EGD performed, banded. MTP called received 2PRBC and 1FFP Cordis placed, arterial line placed. Patient transferred to ICU for variceal bleed.     Plan:  1. Hematemesis   2. Esophageal Variceal bleeding  3. Acute blood loss anemia   4. Cirrhosis   5. Thrombocytopenia   6. Lactic acidosis  7. Acute hypoxic respiratory failure  8. Aspiration pneumonitis     Plan:  - No further signs of bleeding  - S/p Endoscopy with 4 bands deployed by GI  - Octreotide completed   - Monitor hgb, transfuse to hgb > 7, avoid over transfusions   - Empiric antibiotics   - No more episodes of fevers  - ID consult appreciated   - Thiamine and folate supplementation   - Extubated 9/26  - Advanced to soft diet   - Non chemical DVT prophylaxis  - Hepatology consult  - OOB to chair   - PT evaluation  - Transfer out of ICU
48-year-old male with pmhx of cirrhosis and right knee pain presenting with hematemesis. Patient was in endoscopy suite getting EGD RRT called for perfuse hematemesis. Patient was intubated. EGD performed, banded. MTP called received 2PRBC and 1FFP Cordis placed, arterial line placed. Patient transferred to ICU for variceal bleed.     Plan:  1. Hematemesis   2. Esophageal Variceal bleeding  3. Acute blood loss anemia   4. Cirrhosis   5. Thrombocytopenia   6. Lactic acidosis  7. Acute hypoxic respiratory failure  8. Aspiration pneumonitis     Plan:  - No further signs of bleeding  - S/p Endoscopy with 4 bands deployed by GI  - Cont. Octreotide till tomorrow   - Monitor hgb, transfuse to hgb > 7, avoid over transfusions   - d/c arterial line   - Empiric antibiotics   - No more episodes of fevers  - ID consult appreciated   - Thiamine and folate supplementation   - Extubated 9/26  - Start clear liquid diet   - Non chemical DVT prophylaxis  - Hepatology consult  - OOB to chair   - PT evaluation
48-year-old male with pmhx of cirrhosis and right knee pain presenting with hematemesis. Patient was in endoscopy suite getting EGD RRT called for perfuse hematemesis. Patient was intubated. EGD performed, banded. MTP called received 2PRBC and 1FFP Cordis placed, arterial line placed. Patient transferred to ICU for variceal bleed.     Plan:  1. Hematemesis   2. Esophageal Variceal bleeding  3. Acute blood loss anemia   4. Cirrhosis   5. Thrombocytopenia   6. Lactic acidosis  7. Acute hypoxic respiratory failure  8. Aspiration pneumonitis     Plan:  - No further signs of bleeding  - S/p Endoscopy with 4 bands deployed by GI  - Cont. Octreotide   - Monitor hgb, transfuse to hgb > 7, avoid over transfusions   - Hemodynamic monitoring  - Empiric antibiotics   - Febrile this morning, obtain cultures  - ID consult   - Discussed with GI attending, if rebleeds recommends transfer for TIPS  - Thiamine and folate supplementation   - Extubated this morning, monitor respiratory status  - NPO for now  - Non chemical DVT prophylaxis  - PT evaluation
Agree with above.    I saw and examined the patient on 09-26-24. I agree with the findings and pan of care as documented in the fellow/resident/medical student/nurse practitioner/physician assistant.    Today we are treating the patient for:   Variceal bleeding  Alcoholic hepatitis    ***General note with plan / recommendation:   H/H low and slowly trending down. Agree with NPO. Trend H/H.  If Hgb continues to trend down, may require repeat intervention. Avoid NG tube to not dislodge the esophageal bands in place. Needs octreotide for 3-5 days IV post banding.     Billing:  I have reviewed records from labs, endoscopy, CT    Other:  - Thank you for involving us in the care of this patient. If you have any questions regarding the plan of care please do not hesitate to call us back.
Agree with above.    Patient was seen by our team on 09-27-24. I agree with the findings and pan of care as documented in the fellow/resident/medical student/physician assistant/nurse practitioner.    Today we are treating the patient for:   Bleeding esophageal varices  alcoholic hepatitis    ***General note with plan / recommendation:   H/H stable.  Advance diet to clears. On Saturday and Sunday advance to soft diet. He can be on regular diet after Monday.  Can stop Octreotide tomorrow  Continue with PPI  Abx as per ICU team.  Needs follow up with GI/Hepatology for repeat EGD / banding in one month, after discharge.     Billing:  I have reviewed records from labs, endoscopy, CT    Other:  - Thank you for involving us in the care of this patient. If you have any questions regarding the plan of care please do not hesitate to call us back.

## 2024-09-29 DIAGNOSIS — D61.818 OTHER PANCYTOPENIA: ICD-10-CM

## 2024-09-29 DIAGNOSIS — A41.9 SEPSIS, UNSPECIFIED ORGANISM: ICD-10-CM

## 2024-09-29 DIAGNOSIS — E87.8 OTHER DISORDERS OF ELECTROLYTE AND FLUID BALANCE, NOT ELSEWHERE CLASSIFIED: ICD-10-CM

## 2024-09-29 DIAGNOSIS — D69.6 THROMBOCYTOPENIA, UNSPECIFIED: ICD-10-CM

## 2024-09-29 LAB
ALBUMIN SERPL ELPH-MCNC: 3.2 G/DL — LOW (ref 3.5–5)
ALP SERPL-CCNC: 104 U/L — SIGNIFICANT CHANGE UP (ref 40–120)
ALT FLD-CCNC: 32 U/L DA — SIGNIFICANT CHANGE UP (ref 10–60)
ANION GAP SERPL CALC-SCNC: 8 MMOL/L — SIGNIFICANT CHANGE UP (ref 5–17)
AST SERPL-CCNC: 21 U/L — SIGNIFICANT CHANGE UP (ref 10–40)
BILIRUB SERPL-MCNC: 1.1 MG/DL — SIGNIFICANT CHANGE UP (ref 0.2–1.2)
BUN SERPL-MCNC: 12 MG/DL — SIGNIFICANT CHANGE UP (ref 7–18)
CALCIUM SERPL-MCNC: 8 MG/DL — LOW (ref 8.4–10.5)
CHLORIDE SERPL-SCNC: 111 MMOL/L — HIGH (ref 96–108)
CO2 SERPL-SCNC: 23 MMOL/L — SIGNIFICANT CHANGE UP (ref 22–31)
CREAT SERPL-MCNC: 0.94 MG/DL — SIGNIFICANT CHANGE UP (ref 0.5–1.3)
EGFR: 101 ML/MIN/1.73M2 — SIGNIFICANT CHANGE UP
GLUCOSE SERPL-MCNC: 97 MG/DL — SIGNIFICANT CHANGE UP (ref 70–99)
HCT VFR BLD CALC: 25.9 % — LOW (ref 39–50)
HGB BLD-MCNC: 8.6 G/DL — LOW (ref 13–17)
MAGNESIUM SERPL-MCNC: 1.8 MG/DL — SIGNIFICANT CHANGE UP (ref 1.6–2.6)
MCHC RBC-ENTMCNC: 26.9 PG — LOW (ref 27–34)
MCHC RBC-ENTMCNC: 33.2 GM/DL — SIGNIFICANT CHANGE UP (ref 32–36)
MCV RBC AUTO: 80.9 FL — SIGNIFICANT CHANGE UP (ref 80–100)
NRBC # BLD: 0 /100 WBCS — SIGNIFICANT CHANGE UP (ref 0–0)
PHOSPHATE SERPL-MCNC: 3.2 MG/DL — SIGNIFICANT CHANGE UP (ref 2.5–4.5)
PLATELET # BLD AUTO: 54 K/UL — LOW (ref 150–400)
POTASSIUM SERPL-MCNC: 3.4 MMOL/L — LOW (ref 3.5–5.3)
POTASSIUM SERPL-SCNC: 3.4 MMOL/L — LOW (ref 3.5–5.3)
PROT SERPL-MCNC: 6.4 G/DL — SIGNIFICANT CHANGE UP (ref 6–8.3)
RBC # BLD: 3.2 M/UL — LOW (ref 4.2–5.8)
RBC # FLD: 16.9 % — HIGH (ref 10.3–14.5)
SODIUM SERPL-SCNC: 142 MMOL/L — SIGNIFICANT CHANGE UP (ref 135–145)
WBC # BLD: 3.01 K/UL — LOW (ref 3.8–10.5)
WBC # FLD AUTO: 3.01 K/UL — LOW (ref 3.8–10.5)

## 2024-09-29 RX ADMIN — PIPERACILLIN SODIUM AND TAZOBACTAM SODIUM 25 GRAM(S): 12; 1.5 INJECTION, POWDER, LYOPHILIZED, FOR SOLUTION INTRAVENOUS at 21:42

## 2024-09-29 RX ADMIN — CHLORHEXIDINE GLUCONATE ORAL RINSE 1 APPLICATION(S): 1.2 SOLUTION DENTAL at 05:19

## 2024-09-29 RX ADMIN — PANTOPRAZOLE SODIUM 40 MILLIGRAM(S): 40 TABLET, DELAYED RELEASE ORAL at 05:14

## 2024-09-29 RX ADMIN — PIPERACILLIN SODIUM AND TAZOBACTAM SODIUM 25 GRAM(S): 12; 1.5 INJECTION, POWDER, LYOPHILIZED, FOR SOLUTION INTRAVENOUS at 05:14

## 2024-09-29 RX ADMIN — PANTOPRAZOLE SODIUM 40 MILLIGRAM(S): 40 TABLET, DELAYED RELEASE ORAL at 17:59

## 2024-09-29 RX ADMIN — FOLIC ACID 1 MILLIGRAM(S): 1 TABLET ORAL at 11:44

## 2024-09-29 RX ADMIN — PIPERACILLIN SODIUM AND TAZOBACTAM SODIUM 25 GRAM(S): 12; 1.5 INJECTION, POWDER, LYOPHILIZED, FOR SOLUTION INTRAVENOUS at 13:31

## 2024-09-29 RX ADMIN — Medication 40 MILLIEQUIVALENT(S): at 13:31

## 2024-09-29 NOTE — PROGRESS NOTE ADULT - ASSESSMENT
48M pmhx of cirrhosis and right knee pain presenting with hematemesis. Patient received 1PRBC and 1U PLT in the ED. CT angio A/P: wall thickening along inferior esophagus, underlying esophageal tear cannot be excluded, marked splenomegaly, stomach distended with fluid and blood products. Patient was in endoscopy suite getting EGD and RRT called for perfuse hematemesis. Patient was intubated. EGD performed, banded x4. MTP called received 2PRBC and 1FFP Cordis placed, arterial line placed. Patient transferred to ICU for variceal bleed. Patient was started empirically on zosyn, PPI IV BID and on octeotride for 3 days total. Patient was spiking a low grade fever; sputum neg. Pt was started on zosyn empirically. H/H has since been stable. Patient was advanced to clear diet, and then advanced to soft diet. Pt was noted to have injury in pharynx secondary to intubation and EGD trauma. Pt improved and stable for downgrade from ICU.     - f/u BCx  - f/u ID about abx course  - f/u hepatology and hemonc for splenomegaly workup  - trend CBC q24h as per Dr. Rothman 48M pmhx of cirrhosis and right knee pain presenting with hematemesis. Patient received 1PRBC and 1U PLT in the ED. CT angio A/P: wall thickening along inferior esophagus, underlying esophageal tear cannot be excluded, marked splenomegaly, stomach distended with fluid and blood products. Patient was in endoscopy suite getting EGD and RRT called for perfuse hematemesis. Patient was intubated. EGD performed, banded x4. MTP called received 2PRBC and 1FFP Cordis placed, arterial line placed. Patient transferred to ICU for variceal bleed. Patient was started empirically on zosyn, PPI IV BID and on octeotride for 3 days total. Patient was spiking a low grade fever; sputum neg. Pt was started on zosyn empirically. H/H has since been stable. Patient was advanced to clear diet, and then advanced to soft diet. Pt was noted to have injury in pharynx secondary to intubation and EGD trauma. Pt improved and stable for downgrade from ICU.     - BCx: negative  - f/u ID about abx course  - f/u hepatology and hemonc for splenomegaly workup  - trend CBC q24h as per Dr. Rothman

## 2024-09-29 NOTE — PROGRESS NOTE ADULT - SUBJECTIVE AND OBJECTIVE BOX
PGY-1 Progress Note discussed with attending    PAGER #: [[phone]] TILL 5:00 PM  PLEASE CONTACT ON CALL TEAM:  - On Call Team (Please refer to Carlos) FROM 5:00 PM - 8:30PM  - Nightfloat Team FROM 8:30 -7:30 AM      INTERVAL HPI/OVERNIGHT EVENTS:     ---------------------------    REVIEW OF SYSTEMS:  CONSTITUTIONAL: No fever, weight loss, or fatigue  RESPIRATORY: No cough, wheezing, chills or hemoptysis; No shortness of breath  CARDIOVASCULAR: No chest pain, palpitations, dizziness, or leg swelling  GASTROINTESTINAL: No abdominal pain. No nausea, vomiting, or hematemesis; No diarrhea or constipation. No melena or hematochezia.  GENITOURINARY: No dysuria or hematuria, urinary frequency  NEUROLOGICAL: No headaches, memory loss, loss of strength, numbness, or tremors  SKIN: No itching, burning, rashes, or lesions     MEDICATIONS  (STANDING):  chlorhexidine 2% Cloths 1 Application(s) Topical <User Schedule>  folic acid 1 milliGRAM(s) Oral daily  influenza   Vaccine 0.5 milliLiter(s) IntraMuscular once  pantoprazole  Injectable 40 milliGRAM(s) IV Push two times a day  piperacillin/tazobactam IVPB.. 3.375 Gram(s) IV Intermittent every 8 hours    MEDICATIONS  (PRN):  ondansetron Injectable 4 milliGRAM(s) IV Push every 8 hours PRN Nausea and/or Vomiting      Vital Signs Last 24 Hrs  T(C): 36.7 (29 Sep 2024 05:38), Max: 36.8 (28 Sep 2024 12:00)  T(F): 98.1 (29 Sep 2024 05:38), Max: 98.3 (28 Sep 2024 12:00)  HR: 69 (29 Sep 2024 05:38) (65 - 75)  BP: 110/70 (29 Sep 2024 05:38) (103/74 - 116/71)  BP(mean): 85 (28 Sep 2024 13:00) (82 - 86)  RR: 16 (29 Sep 2024 05:38) (11 - 20)  SpO2: 96% (29 Sep 2024 05:38) (95% - 100%)    Parameters below as of 29 Sep 2024 05:38  Patient On (Oxygen Delivery Method): nasal cannula        -----------------------------    PHYSICAL EXAMINATION:  GENERAL: NAD, well built  HEAD:  Atraumatic, Normocephalic  EYES:  conjunctiva and sclera clear  NECK: Supple, No JVD  CHEST/LUNG: Clear to auscultation bilaterally; No rales, rhonchi, wheezing, or rubs  HEART: Regular rate and rhythm; No murmurs, rubs, or gallops  ABDOMEN: Soft, Nontender, Nondistended; Bowel sounds present, no pain or masses on palpation  NERVOUS SYSTEM:  Alert & Oriented X3  : voiding well  EXTREMITIES:  2+ Peripheral Pulses, No clubbing, cyanosis, or edema  SKIN: warm dry                          8.6    3.01  )-----------( 54       ( 29 Sep 2024 05:51 )             25.9     09-29    142  |  111[H]  |  12  ----------------------------<  97  3.4[L]   |  23  |  0.94    Ca    8.0[L]      29 Sep 2024 05:51  Phos  3.2     09-29  Mg     1.8     09-29    TPro  6.4  /  Alb  3.2[L]  /  TBili  1.1  /  DBili  x   /  AST  21  /  ALT  32  /  AlkPhos  104  09-29    LIVER FUNCTIONS - ( 29 Sep 2024 05:51 )  Alb: 3.2 g/dL / Pro: 6.4 g/dL / ALK PHOS: 104 U/L / ALT: 32 U/L DA / AST: 21 U/L / GGT: x                   I&O's Summary    28 Sep 2024 07:01  -  29 Sep 2024 07:00  --------------------------------------------------------  IN: 500 mL / OUT: 550 mL / NET: -50 mL          Culture - Blood (collected 26 Sep 2024 10:00)  Source: .Blood BLOOD  Preliminary Report (28 Sep 2024 13:01):    No growth at 48 Hours    Culture - Blood (collected 26 Sep 2024 09:45)  Source: .Blood BLOOD  Preliminary Report (28 Sep 2024 13:01):    No growth at 48 Hours    Culture - Sputum (collected 26 Sep 2024 09:40)  Source: ET Tube ET Tube  Gram Stain (26 Sep 2024 21:44):    Numerous polymorphonuclear leukocytes per low power field    No Squamous epithelial cells per low power field    No organisms seen per oil power field  Final Report (27 Sep 2024 22:46):    Normal Respiratory Nano present        CAPILLARY BLOOD GLUCOSE      RADIOLOGY & ADDITIONAL TESTS:                   PGY-1 Progress Note discussed with attending    PAGER #: [[phone]] TILL 5:00 PM  PLEASE CONTACT ON CALL TEAM:  - On Call Team (Please refer to Carlos) FROM 5:00 PM - 8:30PM  - Nightfloat Team FROM 8:30 -7:30 AM      INTERVAL HPI/OVERNIGHT EVENTS: As per nurse, Patient reported to have bloody stool yesterday but nothing overnight. Patient seen at bedside with  Jeremias [id number]. Denies any CP, SOB, N/V. Endorses HA, fatigue, lower abd pain.     ---------------------------    REVIEW OF SYSTEMS:     MEDICATIONS  (STANDING):  chlorhexidine 2% Cloths 1 Application(s) Topical <User Schedule>  folic acid 1 milliGRAM(s) Oral daily  influenza   Vaccine 0.5 milliLiter(s) IntraMuscular once  pantoprazole  Injectable 40 milliGRAM(s) IV Push two times a day  piperacillin/tazobactam IVPB.. 3.375 Gram(s) IV Intermittent every 8 hours    MEDICATIONS  (PRN):  ondansetron Injectable 4 milliGRAM(s) IV Push every 8 hours PRN Nausea and/or Vomiting      Vital Signs Last 24 Hrs  T(C): 36.7 (29 Sep 2024 05:38), Max: 36.8 (28 Sep 2024 12:00)  T(F): 98.1 (29 Sep 2024 05:38), Max: 98.3 (28 Sep 2024 12:00)  HR: 69 (29 Sep 2024 05:38) (65 - 75)  BP: 110/70 (29 Sep 2024 05:38) (103/74 - 116/71)  BP(mean): 85 (28 Sep 2024 13:00) (82 - 86)  RR: 16 (29 Sep 2024 05:38) (11 - 20)  SpO2: 96% (29 Sep 2024 05:38) (95% - 100%)    Parameters below as of 29 Sep 2024 05:38  Patient On (Oxygen Delivery Method): nasal cannula        -----------------------------    PHYSICAL EXAMINATION:  GENERAL: NAD, well built  HEAD:  Atraumatic, Normocephalic  CHEST/LUNG: Clear to auscultation bilaterally; No rales, rhonchi, wheezing, or rubs  HEART: Regular rate and rhythm; No murmurs, rubs, or gallops  ABDOMEN: Soft, Nontender, Nondistended; Bowel sounds present, no pain or masses on palpation  NERVOUS SYSTEM: Alert & Oriented X3  EXTREMITIES: No edema  SKIN: warm dry                          8.6    3.01  )-----------( 54       ( 29 Sep 2024 05:51 )             25.9     09-29    142  |  111[H]  |  12  ----------------------------<  97  3.4[L]   |  23  |  0.94    Ca    8.0[L]      29 Sep 2024 05:51  Phos  3.2     09-29  Mg     1.8     09-29    TPro  6.4  /  Alb  3.2[L]  /  TBili  1.1  /  DBili  x   /  AST  21  /  ALT  32  /  AlkPhos  104  09-29    LIVER FUNCTIONS - ( 29 Sep 2024 05:51 )  Alb: 3.2 g/dL / Pro: 6.4 g/dL / ALK PHOS: 104 U/L / ALT: 32 U/L DA / AST: 21 U/L / GGT: x                   I&O's Summary    28 Sep 2024 07:01  -  29 Sep 2024 07:00  --------------------------------------------------------  IN: 500 mL / OUT: 550 mL / NET: -50 mL          Culture - Blood (collected 26 Sep 2024 10:00)  Source: .Blood BLOOD  Preliminary Report (28 Sep 2024 13:01):    No growth at 48 Hours    Culture - Blood (collected 26 Sep 2024 09:45)  Source: .Blood BLOOD  Preliminary Report (28 Sep 2024 13:01):    No growth at 48 Hours    Culture - Sputum (collected 26 Sep 2024 09:40)  Source: ET Tube ET Tube  Gram Stain (26 Sep 2024 21:44):    Numerous polymorphonuclear leukocytes per low power field    No Squamous epithelial cells per low power field    No organisms seen per oil power field  Final Report (27 Sep 2024 22:46):    Normal Respiratory Nano present        CAPILLARY BLOOD GLUCOSE      RADIOLOGY & ADDITIONAL TESTS:

## 2024-09-29 NOTE — PROGRESS NOTE ADULT - SUBJECTIVE AND OBJECTIVE BOX
INTERVAL HPI/OVERNIGHT EVENTS:  Patient seen,was transferrd to regular floor,hemodinamicly stable  VITAL SIGNS:  T(F): 98.1 (09-29-24 @ 05:38)  HR: 69 (09-29-24 @ 05:38)  BP: 110/70 (09-29-24 @ 05:38)  RR: 16 (09-29-24 @ 05:38)  SpO2: 96% (09-29-24 @ 05:38)  Wt(kg): --    PHYSICAL EXAM:  awake  Constitutional:  Eyes:  ENMT:perrla  Neck:  Respiratory:clear  Cardiovascular:s1s2,m-none  Gastrointestinal:soft,bs pos  Extremities:  Vascular:  Neurological:no focal deficit  Musculoskeletal:    MEDICATIONS  (STANDING):  chlorhexidine 2% Cloths 1 Application(s) Topical <User Schedule>  folic acid 1 milliGRAM(s) Oral daily  influenza   Vaccine 0.5 milliLiter(s) IntraMuscular once  pantoprazole  Injectable 40 milliGRAM(s) IV Push two times a day  piperacillin/tazobactam IVPB.. 3.375 Gram(s) IV Intermittent every 8 hours    MEDICATIONS  (PRN):  ondansetron Injectable 4 milliGRAM(s) IV Push every 8 hours PRN Nausea and/or Vomiting      Allergies    No Known Allergies    Intolerances        LABS:                        8.6    3.01  )-----------( 54       ( 29 Sep 2024 05:51 )             25.9     09-29    142  |  111[H]  |  12  ----------------------------<  97  3.4[L]   |  23  |  0.94    Ca    8.0[L]      29 Sep 2024 05:51  Phos  3.2     09-29  Mg     1.8     09-29    TPro  6.4  /  Alb  3.2[L]  /  TBili  1.1  /  DBili  x   /  AST  21  /  ALT  32  /  AlkPhos  104  09-29      Urinalysis Basic - ( 29 Sep 2024 05:51 )    Color: x / Appearance: x / SG: x / pH: x  Gluc: 97 mg/dL / Ketone: x  / Bili: x / Urobili: x   Blood: x / Protein: x / Nitrite: x   Leuk Esterase: x / RBC: x / WBC x   Sq Epi: x / Non Sq Epi: x / Bacteria: x        RADIOLOGY & ADDITIONAL TESTS:      Assessment and Plan:   · Assessment	  Assessment:  48-year-old male with pmhx of cirrhosis and right knee pain presenting with hematemesis. Patient was in endoscopy suite getting EGD RRT called for perfuse hematemesis. Patient was intubated. EGD performed, banded. MTP called received 2PRBC and 1FFP Cordis placed, arterial line placed. Patient transferred to ICU for variceal bleed. Pt has since been stable H/H, downgrade from ICU.    ENT:  - Pt with healing trauma of oropharynx 2/2 intubation and EGD  - Monitor for signs of infection    Infectious Diseases:  #concern for sepsis, resolved  - concern for SBP vs aspiration PNA vs ?infection from cordis with hx of cirrhosis, profuse bleeding from varices during EGD  - temp: 100.2 at AM, WBC: 3.54 on 9/26, cordis removed on 9/26  - afebrile on 9/27, right radial arterial line removed on 9/27  - sputum Cx: neg  - f/u BCx  - c/w zosyn 3.375g q8h (9/25 - )  - ID Dr. 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  #Splenomegaly, out of proportion to cirrhosis  p/w hematemesis, was taking ibuprofen every other day for 1 week before ED admission for right knee pain  CT angio A/P: wall thickening along inferior esophagus, underlying esophageal tear cannot be excluded, marked splenomegaly, stomach distended with fluid and blood products  EGD: esophagus - bright red blood, active arterial bleed visualized at distal esophagus   Initial Hb: 6.5> 1PRBC, 1PLT> 7.2> 2PRBC, 2FFP, 2PLT > 6.7 > 1PRBC > 7.1 > 7.2 >7.4>7.5>7.6 - H/H stable  MELD score: 9  s/p EGD w/ banding x4 on 9/25  s/p octreotide drip for 72 h (9/25 - 9/27) - plan to d/c octreotide on 9/28  c/w PPI IV BID, thiamine and folic acid  Soft diet; Start regular diet after Monday  GI following  consulted hepatology Dr. Mayo  - F/u hepatology and heme/onc for splenomegaly w/u    Renal:  #Hypernatremia, resolved  2/2 poor oral intake      Heme/onc:   #Thrombocytopenia, leukopenia   #Anemia 2/2 blood loss  #Splenomegaly, out of proportion to cirrhosis  2/2 cirrhosis   s/p 4PRBC, 3 PLT, 2 FFP   H/H stable  Maintain active type and screen   Transfuse for Hb < 7   Trend CBC q12h  - F/u hepatology and heme/onc for splenomegaly w/u    Prophylaxis:   #DVT ppx SCD    Goals of Care: Full code

## 2024-09-29 NOTE — PROGRESS NOTE ADULT - PROBLEM SELECTOR PLAN 1
#variceal bleed  #melena  #cirrhosis 2/2 EtOH    p/w hematemesis, was taking ibuprofen every other day for 1 week before ED admission for right knee pain  Patient received 1PRBC and 1U PLT in the ED  CT angio A/P: wall thickening along inferior esophagus, underlying esophageal tear cannot be excluded, marked splenomegaly, stomach distended with fluid and blood products  EGD: esophagus - bright red blood, active arterial bleed visualized at distal esophagus. RRT called for perfuse hematemesis. Patient was intubated.   s/p EGD w/banding x 4 on 9/25   MTP called received 2PRBC and 1FFP Cordis placed, arterial line placed.   Patient transferred to ICU for variceal bleed.   Patient was started empirically on zosyn, PPI IV BID and on octeotride for 3 days total.    -c/w Zosyn, IV PPI  -On soft diet, will transition to regular after Monday #variceal bleed  #melena  #cirrhosis 2/2 EtOH  p/w hematemesis, was taking ibuprofen every other day for 1 week before ED admission for right knee pain  Patient received 1PRBC and 1U PLT in the ED  CT angio A/P: wall thickening along inferior esophagus, underlying esophageal tear cannot be excluded, marked splenomegaly, stomach distended with fluid and blood products  EGD: esophagus - bright red blood, active arterial bleed visualized at distal esophagus. RRT called for perfuse hematemesis. Patient was intubated.   s/p EGD w/banding x 4 on 9/25   s/p MTP: 2PRBC and 1FFP Cordis placed, arterial line placed.   Patient transferred to ICU for variceal bleed.   Patient was started empirically on zosyn, PPI IV BID and on octeotride for 3 days total.    -c/w Zosyn, IV PPI, thiamine, folic acid  -On soft diet, will transition to regular after Monday  -f/u hep, heme onc for splenomegaly workup  -Trend CBC q24h

## 2024-09-30 LAB
ALBUMIN SERPL ELPH-MCNC: 3.2 G/DL — LOW (ref 3.5–5)
ALP SERPL-CCNC: 158 U/L — HIGH (ref 40–120)
ALT FLD-CCNC: 41 U/L DA — SIGNIFICANT CHANGE UP (ref 10–60)
ANION GAP SERPL CALC-SCNC: 8 MMOL/L — SIGNIFICANT CHANGE UP (ref 5–17)
AST SERPL-CCNC: 23 U/L — SIGNIFICANT CHANGE UP (ref 10–40)
BILIRUB SERPL-MCNC: 0.9 MG/DL — SIGNIFICANT CHANGE UP (ref 0.2–1.2)
BLD GP AB SCN SERPL QL: SIGNIFICANT CHANGE UP
BUN SERPL-MCNC: 13 MG/DL — SIGNIFICANT CHANGE UP (ref 7–18)
CALCIUM SERPL-MCNC: 8.6 MG/DL — SIGNIFICANT CHANGE UP (ref 8.4–10.5)
CHLORIDE SERPL-SCNC: 110 MMOL/L — HIGH (ref 96–108)
CO2 SERPL-SCNC: 23 MMOL/L — SIGNIFICANT CHANGE UP (ref 22–31)
CREAT SERPL-MCNC: 0.95 MG/DL — SIGNIFICANT CHANGE UP (ref 0.5–1.3)
EGFR: 99 ML/MIN/1.73M2 — SIGNIFICANT CHANGE UP
GLUCOSE SERPL-MCNC: 85 MG/DL — SIGNIFICANT CHANGE UP (ref 70–99)
HCT VFR BLD CALC: 26.3 % — LOW (ref 39–50)
HGB BLD-MCNC: 8.7 G/DL — LOW (ref 13–17)
MAGNESIUM SERPL-MCNC: 2 MG/DL — SIGNIFICANT CHANGE UP (ref 1.6–2.6)
MCHC RBC-ENTMCNC: 26.6 PG — LOW (ref 27–34)
MCHC RBC-ENTMCNC: 33.1 GM/DL — SIGNIFICANT CHANGE UP (ref 32–36)
MCV RBC AUTO: 80.4 FL — SIGNIFICANT CHANGE UP (ref 80–100)
NRBC # BLD: 0 /100 WBCS — SIGNIFICANT CHANGE UP (ref 0–0)
PHOSPHATE SERPL-MCNC: 3.8 MG/DL — SIGNIFICANT CHANGE UP (ref 2.5–4.5)
PLATELET # BLD AUTO: 53 K/UL — LOW (ref 150–400)
POTASSIUM SERPL-MCNC: 3.7 MMOL/L — SIGNIFICANT CHANGE UP (ref 3.5–5.3)
POTASSIUM SERPL-SCNC: 3.7 MMOL/L — SIGNIFICANT CHANGE UP (ref 3.5–5.3)
PROT SERPL-MCNC: 6.6 G/DL — SIGNIFICANT CHANGE UP (ref 6–8.3)
RBC # BLD: 3.27 M/UL — LOW (ref 4.2–5.8)
RBC # FLD: 17 % — HIGH (ref 10.3–14.5)
SODIUM SERPL-SCNC: 141 MMOL/L — SIGNIFICANT CHANGE UP (ref 135–145)
WBC # BLD: 2.4 K/UL — LOW (ref 3.8–10.5)
WBC # FLD AUTO: 2.4 K/UL — LOW (ref 3.8–10.5)

## 2024-09-30 PROCEDURE — 71045 X-RAY EXAM CHEST 1 VIEW: CPT | Mod: 26

## 2024-09-30 PROCEDURE — 99231 SBSQ HOSP IP/OBS SF/LOW 25: CPT

## 2024-09-30 RX ORDER — FOLIC ACID 1 MG/1
1 TABLET ORAL
Qty: 0 | Refills: 0 | DISCHARGE
Start: 2024-09-30

## 2024-09-30 RX ADMIN — PIPERACILLIN SODIUM AND TAZOBACTAM SODIUM 25 GRAM(S): 12; 1.5 INJECTION, POWDER, LYOPHILIZED, FOR SOLUTION INTRAVENOUS at 05:42

## 2024-09-30 RX ADMIN — PIPERACILLIN SODIUM AND TAZOBACTAM SODIUM 25 GRAM(S): 12; 1.5 INJECTION, POWDER, LYOPHILIZED, FOR SOLUTION INTRAVENOUS at 13:14

## 2024-09-30 RX ADMIN — CHLORHEXIDINE GLUCONATE ORAL RINSE 1 APPLICATION(S): 1.2 SOLUTION DENTAL at 05:41

## 2024-09-30 RX ADMIN — FOLIC ACID 1 MILLIGRAM(S): 1 TABLET ORAL at 11:37

## 2024-09-30 RX ADMIN — PANTOPRAZOLE SODIUM 40 MILLIGRAM(S): 40 TABLET, DELAYED RELEASE ORAL at 17:20

## 2024-09-30 RX ADMIN — PANTOPRAZOLE SODIUM 40 MILLIGRAM(S): 40 TABLET, DELAYED RELEASE ORAL at 05:42

## 2024-09-30 NOTE — DISCHARGE NOTE PROVIDER - NSDCMRMEDTOKEN_GEN_ALL_CORE_FT
folic acid 1 mg oral tablet: 1 tab(s) orally once a day   folic acid 1 mg oral tablet: 1 tab(s) orally once a day  pantoprazole 40 mg oral delayed release tablet: 1 tab(s) orally once a day

## 2024-09-30 NOTE — PROGRESS NOTE ADULT - PROBLEM SELECTOR PLAN 5
#Hypernatremia on admission likely 2/2 to poor oral intake-RESOLVED  #Hypokalemia    Replete PRN
#Hypernatremia on admission likely 2/2 to poor oral intake-RESOLVED  #Hypokalemia    Replete PRN

## 2024-09-30 NOTE — PROGRESS NOTE ADULT - SUBJECTIVE AND OBJECTIVE BOX
Chief Complaint:  Patient is a 48y old  Male who presents with a chief complaint of GI bleed (29 Sep 2024 09:24)      Reason for consult:    Interval Events:     Hospital Medications:  chlorhexidine 2% Cloths 1 Application(s) Topical <User Schedule>  folic acid 1 milliGRAM(s) Oral daily  influenza   Vaccine 0.5 milliLiter(s) IntraMuscular once  ondansetron Injectable 4 milliGRAM(s) IV Push every 8 hours PRN  pantoprazole  Injectable 40 milliGRAM(s) IV Push two times a day  piperacillin/tazobactam IVPB.. 3.375 Gram(s) IV Intermittent every 8 hours      ROS:   General:  No  fevers, chills, night sweats, fatigue  Eyes:  Good vision, no reported pain  ENT:  No sore throat, pain, runny nose  CV:  No pain, palpitations  Pulm:  No dyspnea, cough  GI:  See HPI, otherwise negative  :  No  incontinence, nocturia  Muscle:  No pain, weakness  Neuro:  No memory problems  Psych:  No insomnia, mood problems, depression  Endocrine:  No polyuria, polydipsia, cold/heat intolerance  Heme:  No petechiae, ecchymosis, easy bruisability  Skin:  No rash    PHYSICAL EXAM:   Vital Signs:  Vital Signs Last 24 Hrs  T(C): 36.7 (30 Sep 2024 05:04), Max: 36.8 (29 Sep 2024 20:32)  T(F): 98 (30 Sep 2024 05:04), Max: 98.2 (29 Sep 2024 20:32)  HR: 74 (30 Sep 2024 05:04) (69 - 74)  BP: 99/63 (30 Sep 2024 05:04) (99/63 - 125/74)  BP(mean): --  RR: 18 (30 Sep 2024 05:04) (17 - 18)  SpO2: 96% (30 Sep 2024 05:04) (96% - 99%)    Parameters below as of 30 Sep 2024 05:04  Patient On (Oxygen Delivery Method): room air      Daily     Daily     GENERAL: no acute distress  NEURO: alert, no asterixis  HEENT: anicteric sclera, no conjunctival pallor appreciated  CHEST: no respiratory distress, no accessory muscle use  CARDIAC: regular rate, rhythm  ABDOMEN: soft, non-tender, non-distended, no rebound or guarding  EXTREMITIES: warm, well perfused, no edema  SKIN: no lesions noted    LABS: reviewed                        8.6    3.01  )-----------( 54       ( 29 Sep 2024 05:51 )             25.9     09-29    142  |  111[H]  |  12  ----------------------------<  97  3.4[L]   |  23  |  0.94    Ca    8.0[L]      29 Sep 2024 05:51  Phos  3.2     09-29  Mg     1.8     09-29    TPro  6.4  /  Alb  3.2[L]  /  TBili  1.1  /  DBili  x   /  AST  21  /  ALT  32  /  AlkPhos  104  09-29    LIVER FUNCTIONS - ( 29 Sep 2024 05:51 )  Alb: 3.2 g/dL / Pro: 6.4 g/dL / ALK PHOS: 104 U/L / ALT: 32 U/L DA / AST: 21 U/L / GGT: x             Interval Diagnostic Studies: see sunrise for full report   Chief Complaint:  Patient is a 48y old  Male who presents with a chief complaint of GI bleed (29 Sep 2024 09:24)      Reason for consult: Cirrhosis    Interval Events: Hb remained stable.    Hospital Medications:  chlorhexidine 2% Cloths 1 Application(s) Topical <User Schedule>  folic acid 1 milliGRAM(s) Oral daily  influenza   Vaccine 0.5 milliLiter(s) IntraMuscular once  ondansetron Injectable 4 milliGRAM(s) IV Push every 8 hours PRN  pantoprazole  Injectable 40 milliGRAM(s) IV Push two times a day  piperacillin/tazobactam IVPB.. 3.375 Gram(s) IV Intermittent every 8 hours          PHYSICAL EXAM:   Vital Signs:  Vital Signs Last 24 Hrs  T(C): 36.7 (30 Sep 2024 05:04), Max: 36.8 (29 Sep 2024 20:32)  T(F): 98 (30 Sep 2024 05:04), Max: 98.2 (29 Sep 2024 20:32)  HR: 74 (30 Sep 2024 05:04) (69 - 74)  BP: 99/63 (30 Sep 2024 05:04) (99/63 - 125/74)  BP(mean): --  RR: 18 (30 Sep 2024 05:04) (17 - 18)  SpO2: 96% (30 Sep 2024 05:04) (96% - 99%)    Parameters below as of 30 Sep 2024 05:04  Patient On (Oxygen Delivery Method): room air      Daily     Daily         LABS: reviewed                        8.6    3.01  )-----------( 54       ( 29 Sep 2024 05:51 )             25.9     09-29    142  |  111[H]  |  12  ----------------------------<  97  3.4[L]   |  23  |  0.94    Ca    8.0[L]      29 Sep 2024 05:51  Phos  3.2     09-29  Mg     1.8     09-29    TPro  6.4  /  Alb  3.2[L]  /  TBili  1.1  /  DBili  x   /  AST  21  /  ALT  32  /  AlkPhos  104  09-29    LIVER FUNCTIONS - ( 29 Sep 2024 05:51 )  Alb: 3.2 g/dL / Pro: 6.4 g/dL / ALK PHOS: 104 U/L / ALT: 32 U/L DA / AST: 21 U/L / GGT: x             Interval Diagnostic Studies: see sunrise for full report

## 2024-09-30 NOTE — DISCHARGE NOTE PROVIDER - CARE PROVIDERS DIRECT ADDRESSES
,[email or web address],DirectAddress_Unknown,DirectAddress_Unknown ,[email or web address],DirectAddress_Unknown

## 2024-09-30 NOTE — PROGRESS NOTE ADULT - PROBLEM SELECTOR PLAN 1
CT angio A/P: wall thickening along inferior esophagus, underlying esophageal tear cannot be excluded, marked splenomegaly, stomach distended with fluid and blood products  EGD: esophagus - bright red blood, active arterial bleed visualized at distal esophagus. RRT called for perfuse hematemesis. Patient was intubated.   s/p EGD w/banding x 4 on 9/25   s/p MTP: 2PRBC and 1FFP Cordis placed, arterial line placed.   Patient transferred to ICU for variceal bleed.   s/p zosyn, PPI IV BID and on octeotride for 3 days total.  -c/w Zosyn, IV PPI, thiamine, folic acid  regular diet  -f/u hep, heme onc for splenomegaly workup  -Trend CBC q24h

## 2024-09-30 NOTE — PROGRESS NOTE ADULT - ASSESSMENT
no evidence of Aspiration Pneumonia  Fever - resolved      Plan - DC  Zosyn   DC planning  reconsult prn.

## 2024-09-30 NOTE — DISCHARGE NOTE PROVIDER - NSDCFUADDAPPT_GEN_ALL_CORE_FT
Para seguimiento médico gastrointestinal  [address] [place], [place]  [address]  [phone]  seguimiento en 1 mes Para seguimiento médico gastrointestinal  [address] [place], [place]  [address]  [phone]  seguimiento en 1 mes    O    [facility]  [address] [place], [place] [id number] [phone]  seguimiento en 1 mes

## 2024-09-30 NOTE — PROGRESS NOTE ADULT - SUBJECTIVE AND OBJECTIVE BOX
48y Male    Meds:  piperacillin/tazobactam IVPB.. 3.375 Gram(s) IV Intermittent every 8 hours    Allergies    No Known Allergies    Intolerances        VITALS:  Vital Signs Last 24 Hrs  T(C): 36.6 (30 Sep 2024 13:10), Max: 36.8 (29 Sep 2024 20:32)  T(F): 97.8 (30 Sep 2024 13:10), Max: 98.2 (29 Sep 2024 20:32)  HR: 69 (30 Sep 2024 13:10) (69 - 74)  BP: 108/62 (30 Sep 2024 13:10) (99/63 - 125/74)  BP(mean): --  RR: 18 (30 Sep 2024 13:10) (18 - 18)  SpO2: 99% (30 Sep 2024 13:10) (96% - 99%)    Parameters below as of 30 Sep 2024 13:10  Patient On (Oxygen Delivery Method): room air        LABS/DIAGNOSTIC TESTS:                          8.7    2.40  )-----------( 53       ( 30 Sep 2024 07:20 )             26.3         09-30    141  |  110[H]  |  13  ----------------------------<  85  3.7   |  23  |  0.95    Ca    8.6      30 Sep 2024 07:20  Phos  3.8     09-30  Mg     2.0     09-30    TPro  6.6  /  Alb  3.2[L]  /  TBili  0.9  /  DBili  x   /  AST  23  /  ALT  41  /  AlkPhos  158[H]  09-30      LIVER FUNCTIONS - ( 30 Sep 2024 07:20 )  Alb: 3.2 g/dL / Pro: 6.6 g/dL / ALK PHOS: 158 U/L / ALT: 41 U/L DA / AST: 23 U/L / GGT: x             CULTURES: .Blood BLOOD  09-26 @ 10:00   No growth at 4 days  --  --      .Blood BLOOD  09-26 @ 09:45   No growth at 4 days  --  --      ET Tube ET Tube  09-26 @ 09:40   Normal Respiratory Nano present  --    Numerous polymorphonuclear leukocytes per low power field  No Squamous epithelial cells per low power field  No organisms seen per oil power field            RADIOLOGY:      ROS:  [  ] UNABLE TO ELICIT 48y Male who is doing great, he has no fevers or chills , no coughing , no SOB, no chest pains, no nausea, vomiting or hematemesis. He had a repeat CXR but official reading is pending but on my review he has no infiltrates.     Meds:  piperacillin/tazobactam IVPB.. 3.375 Gram(s) IV Intermittent every 8 hours    Allergies    No Known Allergies    Intolerances        VITALS:  Vital Signs Last 24 Hrs  T(C): 36.6 (30 Sep 2024 13:10), Max: 36.8 (29 Sep 2024 20:32)  T(F): 97.8 (30 Sep 2024 13:10), Max: 98.2 (29 Sep 2024 20:32)  HR: 69 (30 Sep 2024 13:10) (69 - 74)  BP: 108/62 (30 Sep 2024 13:10) (99/63 - 125/74)  BP(mean): --  RR: 18 (30 Sep 2024 13:10) (18 - 18)  SpO2: 99% (30 Sep 2024 13:10) (96% - 99%)    Parameters below as of 30 Sep 2024 13:10  Patient On (Oxygen Delivery Method): room air        LABS/DIAGNOSTIC TESTS:                          8.7    2.40  )-----------( 53       ( 30 Sep 2024 07:20 )             26.3         09-30    141  |  110[H]  |  13  ----------------------------<  85  3.7   |  23  |  0.95    Ca    8.6      30 Sep 2024 07:20  Phos  3.8     09-30  Mg     2.0     09-30    TPro  6.6  /  Alb  3.2[L]  /  TBili  0.9  /  DBili  x   /  AST  23  /  ALT  41  /  AlkPhos  158[H]  09-30      LIVER FUNCTIONS - ( 30 Sep 2024 07:20 )  Alb: 3.2 g/dL / Pro: 6.6 g/dL / ALK PHOS: 158 U/L / ALT: 41 U/L DA / AST: 23 U/L / GGT: x             CULTURES: .Blood BLOOD  09-26 @ 10:00   No growth at 4 days  --  --      .Blood BLOOD  09-26 @ 09:45   No growth at 4 days  --  --      ET Tube ET Tube  09-26 @ 09:40   Normal Respiratory Nano present  --    Numerous polymorphonuclear leukocytes per low power field  No Squamous epithelial cells per low power field  No organisms seen per oil power field            RADIOLOGY:      ROS:  [  ] UNABLE TO ELICIT

## 2024-09-30 NOTE — PROGRESS NOTE ADULT - ASSESSMENT
48y Male w/ Hx of former AUD (reportedly quit 7 years ago), cirrhosis, presented to [facility] ER on 9/25/24 w/ UGIB (hematemesis x5) in setting of cirrhosis and taking ibuprofen for knee pain every other day for a week, and also reported melena ~ 2 weeks prior to admission, was found to be hypotensive, anemic (Hb 6.5 on admission), CT /p showed wall thickening along the inferior esophagus raising possibility of esophageal tear, as well as marked splenomegaly (22 cm), and became unstable during EGD, requiring initiation of MTP and transfer to ICU. EGD  9/25/24 showed bright red blood in esophagus with active arterial (?) bleed, varix w/ nipple sign squirting and got banded (x4).   Hepatology was consulted 9/27 b/o cirrhosis.     UGIB in setting of cirrhosis and NSAID use  - C/w mgmt per GI  - Agree w/ iv PPI bid, to c/w octreotide and SBP ppx  - Monitor H/H, keep Hb >7, and keep PLT > 50 and fibrinogen > 120 if bleeding  - If rebleeds, consider transfer to [facility]    Cirrhosis, MELD 3.0 9  - Patient does not meet criteria for alcoholic hepatitis (no jaundice, no recent alcohol use)  - Please, send Peth to confirm abstinence  - No PSE, monitor mental status  - No ascites, Cr wNL, monitor renal function  - Transplant candidacy: need further information, MELD is low, but variceal bleed is decompensation  - F/u final infectious workup  - Send CLD workup, including hepatitis serologies, autoimmune workup, A1AT, ceruloplasmin    Thrombocytopenia  Marked splenomegaly  - While patient has cirrhosis and liver slightly nodular, splenomegaly is very significant, consider hematology consult to evaluate for any secondary causes in addition to cirrhosis      Thank you for consult  Hepatology will follow  INCOMPLETE NOTE, FULL NOTE TO FOLLOW   48y Male w/ Hx of former AUD (reportedly quit 7 years ago), cirrhosis, presented to [facility] ER on 9/25/24 w/ UGIB (hematemesis x5) in setting of cirrhosis and taking ibuprofen for knee pain every other day for a week, and also reported melena ~ 2 weeks prior to admission, was found to be hypotensive, anemic (Hb 6.5 on admission), CT /p showed wall thickening along the inferior esophagus raising possibility of esophageal tear, as well as marked splenomegaly (22 cm), and became unstable during EGD, requiring initiation of MTP and transfer to ICU. EGD  9/25/24 showed bright red blood in esophagus with active arterial (?) bleed, varix w/ nipple sign squirting and got banded (x4).   Hepatology was consulted 9/27 b/o cirrhosis.     UGIB in setting of cirrhosis and NSAID use  - C/w mgmt per GI  - Agree w/ iv PPI bid, to c/w octreotide and SBP ppx  - Monitor H/H, keep Hb >7, and keep PLT > 50 and fibrinogen > 120 if bleeding  - If rebleeds, consider transfer to [facility]    Cirrhosis, MELD 3.0 9  - Patient does not meet criteria for alcoholic hepatitis (no jaundice, no recent alcohol use)  - Please, send Peth to confirm abstinence  - No PSE, monitor mental status  - No ascites, Cr wNL, monitor renal function  - Transplant candidacy: need further information, MELD is low, but variceal bleed is decompensation  - F/u final infectious workup  - Send CLD workup, including hepatitis serologies, autoimmune workup, A1AT, ceruloplasmin    Thrombocytopenia  Marked splenomegaly  - While patient has cirrhosis and liver slightly nodular, splenomegaly is very significant, consider hematology consult to evaluate for any secondary causes in addition to cirrhosis      Thank you for consult  Hepatology will follow

## 2024-09-30 NOTE — DISCHARGE NOTE PROVIDER - PROVIDER TOKENS
PROVIDER:[TOKEN:[[id number]],FOLLOWUP:[1 week]],PROVIDER:[TOKEN:[[id number]],FOLLOWUP:[1 week]],PROVIDER:[TOKEN:[[id number]],FOLLOWUP:[1 month]] PROVIDER:[TOKEN:[[id number]],FOLLOWUP:[1 week]],PROVIDER:[TOKEN:[[id number]],FOLLOWUP:[1 week]]

## 2024-09-30 NOTE — PROGRESS NOTE ADULT - PROBLEM SELECTOR PLAN 4
2/2 cirrhosis  s/p 4 PRBC, 3 PLT, 2 FFP  H/H stable    -Will maintain active T&S  -Will transfuse if Hb < 7  -Will trend CBC q24h  -f/u hepatology, heme/onc for recs
#Anemia 2/2 blood loss  #Thrombocytopenia  #Leukopenia  #Splenomegaly, out of proportion to cirrhosis    2/2 cirrhosis  s/p 4 PRBC, 3 PLT, 2 FFP  H/H stable    -Will maintain active T&S  -Will transfuse if Hb < 7  -Will trend CBC q24h  -f/u hepatology, heme/onc for recs

## 2024-09-30 NOTE — PROGRESS NOTE ADULT - PROBLEM SELECTOR PLAN 2
2/2 EtOH  MELD score: 9  s/p EGD w/banding x 4 on 9/25  s/p octreotide drip for 72h (9/25-9/27)  Hepatology Dr. Mayo following    Will f/u hepatology recs
2/2 EtOH  MELD score: 9  s/p EGD w/banding x 4 on 9/25  s/p octreotide drip for 72h (9/25-9/27)  Hepatology Dr. Mayo following    Will f/u hepatology recs

## 2024-09-30 NOTE — PROGRESS NOTE ADULT - PROBLEM SELECTOR PLAN 3
Concern for sepsis from SBP vs aspiration PNA vs ?infection from cordis with hx of cirrhosis, profuse bleeding from varices during EGD    9/26: 100.2 AM temp, WBC 3.54, Cordis removed on 9/26 9/27: Afebrile, right radial arterial line removed    Sputum Clx negative  Blood Clx negative  ID Dr. King Following    -c/w Zosyn 3.375 mg q8h  -f/u ID to d/c abx
Concern for sepsis from SBP vs aspiration PNA vs ?infection from cordis with hx of cirrhosis, profuse bleeding from varices during EGD    9/26: 100.2 AM temp, WBC 3.54, Cordis removed on 9/26 9/27: Afebrile, right radial arterial line removed    Sputum Clx negative  Blood Clx negative  ID Dr. King Following    -c/w Zosyn 3.375 mg q8h  -f/u ID to d/c abx

## 2024-09-30 NOTE — DISCHARGE NOTE PROVIDER - CARE PROVIDER_API CALL
Haja Mayo  Internal Medicine  [address] [place] [address]  Phone: [phone]  Fax: [phone]  Follow Up Time: 1 week    Jhonny Manning  Internal Medicine  [address] [place] [address], [address]  [place] [address]  Phone: [phone]  Fax: [phone]  Follow Up Time: 1 week    Jordy Fernández  Gastroenterology  [address] [place] [address]  Phone: [phone]  Fax: [phone]  Follow Up Time: 1 month   Haja Mayo  Internal Medicine  [address] [place] [address]  Phone: [phone]  Fax: [phone]  Follow Up Time: 1 week    Jhonny Manning  Internal Medicine  [address] [place] [address], [address]  [place] [address]  Phone: [phone]  Fax: [phone]  Follow Up Time: 1 week

## 2024-09-30 NOTE — DISCHARGE NOTE PROVIDER - HOSPITAL COURSE
48-year-old male with pmhx of cirrhosis and right knee pain presenting with hematemesis. Patient received 1PRBC and 1U PLT in the ED. CT angio A/P: wall thickening along inferior esophagus, underlying esophageal tear cannot be excluded, marked splenomegaly, stomach distended with fluid and blood products. Patient was in endoscopy suite getting EGD and RRT was called for perfuse hematemesis. Patient was intubated. EGD performed which showed active arterial bleed in the distal esophagus, where 4 bands were applied and hemostasis was achieved. MTP called and patient received 2PRBC and 1FFP. Cordis and arterial line were placed. Patient transferred to ICU for variceal bleed. Patient was started empirically on zosyn, PPI IV BID and on octeotride for 3 days total. Patient was spiking a low grade fever; sputum cultures were negative and lines were pulled. Pt was started on zosyn empirically. Patient was extubated on  9/26. H/H has since been stable. Patients diet was advanced and it was well tolerated. Pt was noted to have injury in pharynx secondary to intubation and EGD trauma. Pt improved and stable for downgrade from ICU on 9/28. Patient remained hemodynamically stable. Repeat blood cultures were negative and CXR was negative for infiltrates.      Given patient's improved clinical status and current hemodynamic stability, decision was made to discharge the patient.  Patient is stable for discharge per attending and is advised to follow up with PCP as outpatient     48-year-old male with pmhx of cirrhosis and right knee pain presenting with hematemesis. Patient received 1PRBC and 1U PLT in the ED. CT angio A/P: wall thickening along inferior esophagus, underlying esophageal tear cannot be excluded, marked splenomegaly, stomach distended with fluid and blood products. Patient was in endoscopy suite getting EGD and RRT was called for perfuse hematemesis. Patient was intubated. EGD performed which showed active arterial bleed in the distal esophagus, where 4 bands were applied and hemostasis was achieved. MTP called and patient received 2PRBC and 1FFP. Cordis and arterial line were placed. Patient transferred to ICU for variceal bleed. Patient was started empirically on zosyn, PPI IV BID and on octeotride for 3 days total. Patient was spiking a low grade fever; sputum cultures were negative and lines were pulled. Pt was started on zosyn empirically. Patient was extubated on  9/26. H/H has since been stable. Patients diet was advanced and it was well tolerated. Pt was noted to have injury in pharynx secondary to intubation and EGD trauma. Pt improved and stable for downgrade from ICU on 9/28. Patient remained hemodynamically stable. Repeat blood cultures were negative and CXR was negative for infiltrates. Patient does not have insurance and is to follow up at [facility] for GI.       Given patient's improved clinical status and current hemodynamic stability, decision was made to discharge the patient.  Patient is stable for discharge per attending and is advised to follow up with PCP as outpatient     48-year-old male with pmhx of cirrhosis and right knee pain presenting with hematemesis. Patient received 1PRBC and 1U PLT in the ED. CT angio A/P: wall thickening along inferior esophagus, underlying esophageal tear cannot be excluded, marked splenomegaly, stomach distended with fluid and blood products. Patient was in endoscopy suite getting EGD and RRT was called for perfuse hematemesis. Patient was intubated. EGD performed which showed active arterial bleed in the distal esophagus, where 4 bands were applied and hemostasis was achieved. MTP called and patient received 2PRBC and 1FFP. Cordis and arterial line were placed. Patient transferred to ICU for variceal bleed. Patient was started empirically on zosyn, PPI IV BID and on octeotride for 3 days total. Patient was spiking a low grade fever; sputum cultures were negative and lines were pulled. Pt was started on zosyn empirically. Patient was extubated on  9/26. H/H has since been stable. Patients diet was advanced and it was well tolerated. Pt was noted to have injury in pharynx secondary to intubation and EGD trauma. Pt improved and stable for downgrade from ICU on 9/28. Patient remained hemodynamically stable. Repeat blood cultures were negative and CXR was negative for infiltrates. Patient does not have insurance and is to follow up at [facility] for GI in one month.       Given patient's improved clinical status and current hemodynamic stability, decision was made to discharge the patient.  Patient is stable for discharge per attending and is advised to follow up with PCP as outpatient

## 2024-09-30 NOTE — DISCHARGE NOTE PROVIDER - NSDCCPCAREPLAN_GEN_ALL_CORE_FT
PRINCIPAL DISCHARGE DIAGNOSIS  Diagnosis: GI bleed  Assessment and Plan of Treatment: You had bleeding from your upper gastrointestinal tract. This was secondary to esophageal varicies. You were given packed red blood cells and platelets. You were also given a medication called OCTREOTIDE which helped control your bleeding. You were taken to the ICU for a higher level of care. You were intubated and EGD was performed where your varicies were clipped. You were extubated and sent to the medicine floor due to no longer requiring IV needs. You are advised to FOLLOW UP with your PRIMARY CARE PHYSICIAN and GASTROENTEROLOGIST within 1 week of being discharged.      SECONDARY DISCHARGE DIAGNOSES  Diagnosis: Cirrhosis  Assessment and Plan of Treatment:     Diagnosis: Thrombocytopenia  Assessment and Plan of Treatment:      PRINCIPAL DISCHARGE DIAGNOSIS  Diagnosis: GI bleed  Assessment and Plan of Treatment: You had bleeding from your upper gastrointestinal tract. This was secondary to esophageal varicies. You were given packed red blood cells and platelets. You were also given a medication called OCTREOTIDE which helped control your bleeding. You were taken to the ICU for a higher level of care. You were intubated and EGD was performed where your varicies were clipped. You were extubated and sent to the medicine floor due to no longer requiring IV needs. You are advised to FOLLOW UP with your PRIMARY CARE PHYSICIAN and GASTROENTEROLOGIST within 1 week of being discharged.      SECONDARY DISCHARGE DIAGNOSES  Diagnosis: Anemia  Assessment and Plan of Treatment: as above    Diagnosis: Cirrhosis  Assessment and Plan of Treatment: You have cirrhosis of your liver secondary to your alcohol usage. Please continue taking folic acid. You are advised to FOLLOW UP with your HEPATOLOGIST and PRIMARY CARE PHYSICIAN within 1 week of being discharged.    Diagnosis: Thrombocytopenia  Assessment and Plan of Treatment: You have low platelets. This is likely secondary to your liver cirrhosis. You are advised to FOLLOW UP with your HEPATOLOGIST and PRIMARY CARE PHYSICIAN within 1 week of being discharged.     PRINCIPAL DISCHARGE DIAGNOSIS  Diagnosis: GI bleed  Assessment and Plan of Treatment: You had bleeding from your upper gastrointestinal tract. This was secondary to esophageal varicies. You were given packed red blood cells and platelets. You were also given a medication called OCTREOTIDE which helped control your bleeding and PROTONIX IV. You were taken to the ICU for a higher level of care. You were intubated and EGD was performed where your varicies were clipped. You were extubated and sent to the medicine floor due to no longer requiring ICU needs. You are to be continued on PROTONIX 40 ONCE A DAY. You are advised to FOLLOW UP with your PRIMARY CARE PHYSICIAN and GASTROENTEROLOGIST within 1 week of being discharged.  Tuvo sangrado en el tracto gastrointestinal superior. [place] fue secundario a varices esofágicas. Le administraron concentrados de glóbulos rojos y plaquetas. También le dieron un medicamento llamado OCTREOTIDE que ayudó a controlar wick sangrado y PROTONIX IV. Lo llevaron a la UCI para recibir un nivel superior de atención. Lo intubaron y le realizaron ferdinand EGD donde le cortaron las várices. Lo extubaron y lo enviaron al piso de medicina porque ya no necesitaba necesidades de UCI. Debe continuar tomando PROTONIX 40 FERDINAND VEZ AL DÍA. Se le recomienda realizar un SEGUIMIENTO con wick MÉDICO DE ATENCIÓN PRIMARIA y wick GASTROENTEROLOGO dentro de 1 semana de esequiel sido dado de jackie.      SECONDARY DISCHARGE DIAGNOSES  Diagnosis: Anemia  Assessment and Plan of Treatment: as above    Diagnosis: Cirrhosis  Assessment and Plan of Treatment: You have cirrhosis of your liver secondary to your alcohol usage. Please continue taking folic acid. You are advised to FOLLOW UP with your HEPATOLOGIST and PRIMARY CARE PHYSICIAN within 1 week of being discharged.  Tiene cirrosis hepática secundaria al consumo de alcohol. Por favor continúe tomando ácido fólico. Se le recomienda realizar un SEGUIMIENTO con wick HEPATÓLOGO y MÉDICO DE ATENCIÓN PRIMARIA dentro de 1 semana de esequiel sido dado de jackie.    Diagnosis: Thrombocytopenia  Assessment and Plan of Treatment: You have low platelets. This is likely secondary to your liver cirrhosis. You are advised to FOLLOW UP with your HEPATOLOGIST and PRIMARY CARE PHYSICIAN within 1 week of being discharged.  Tienes plaquetas bajas. Es probable que esto sea secundario a wick cirrosis hepática. Se le recomienda realizar un SEGUIMIENTO con wick HEPATÓLOGO y MÉDICO DE ATENCIÓN PRIMARIA dentro de 1 semana de esequiel sido dado de jackie.       PRINCIPAL DISCHARGE DIAGNOSIS  Diagnosis: GI bleed  Assessment and Plan of Treatment: You had bleeding from your upper gastrointestinal tract. This was secondary to esophageal varicies. You were given packed red blood cells and platelets. You were also given a medication called OCTREOTIDE which helped control your bleeding and PROTONIX IV. You were taken to the ICU for a higher level of care. You were intubated and EGD was performed where your varicies were clipped. You were extubated and sent to the medicine floor due to no longer requiring ICU needs. You are to be continued on PROTONIX 40 ONCE A DAY. You are advised to FOLLOW UP with your PRIMARY CARE PHYSICIAN in one week and GASTROENTEROLOGIST within 1 month of being discharged.  Tuvo sangrado en el tracto gastrointestinal superior. [place] fue secundario a varices esofágicas. Le administraron concentrados de glóbulos rojos y plaquetas. También le dieron un medicamento llamado OCTREOTIDE que ayudó a controlar wick sangrado y PROTONIX IV. Lo llevaron a la UCI para recibir un nivel superior de atención. Lo intubaron y le realizaron ferdinand EGD donde le cortaron las várices. Lo extubaron y lo enviaron al piso de medicina porque ya no necesitaba necesidades de UCI. Debe continuar tomando PROTONIX 40 FERDINAND VEZ AL DÍA. Se le recomienda realizar un SEGUIMIENTO con wick MÉDICO DE ATENCIÓN PRIMARIA 1 semana y wick GASTROENTEROLOGO dentro de 1 mes de esequiel sido dado de jackie.      SECONDARY DISCHARGE DIAGNOSES  Diagnosis: Anemia  Assessment and Plan of Treatment: as above    Diagnosis: Cirrhosis  Assessment and Plan of Treatment: You have cirrhosis of your liver secondary to your alcohol usage. Please continue taking folic acid. You are advised to FOLLOW UP with your HEPATOLOGIST and PRIMARY CARE PHYSICIAN within 1 week of being discharged.  Tiene cirrosis hepática secundaria al consumo de alcohol. Por favor continúe tomando ácido fólico. Se le recomienda realizar un SEGUIMIENTO con wick HEPATÓLOGO y MÉDICO DE ATENCIÓN PRIMARIA dentro de 1 semana de esequiel sido dado de jackie.    Diagnosis: Thrombocytopenia  Assessment and Plan of Treatment: You have low platelets. This is likely secondary to your liver cirrhosis. You are advised to FOLLOW UP with your HEPATOLOGIST and PRIMARY CARE PHYSICIAN within 1 week of being discharged.  Tienes plaquetas bajas. Es probable que esto sea secundario a wick cirrosis hepática. Se le recomienda realizar un SEGUIMIENTO con wick HEPATÓLOGO y MÉDICO DE ATENCIÓN PRIMARIA dentro de 1 semana de esequiel sido dado de jackie.

## 2024-09-30 NOTE — PROGRESS NOTE ADULT - ASSESSMENT
48M pmhx of cirrhosis and right knee pain presenting with hematemesis. Patient received 1PRBC and 1U PLT in the ED. CT angio A/P: wall thickening along inferior esophagus, underlying esophageal tear cannot be excluded, marked splenomegaly, stomach distended with fluid and blood products. Patient was in endoscopy suite getting EGD and RRT called for perfuse hematemesis. Patient was intubated. EGD performed, banded x4. MTP called received 2PRBC and 1FFP Cordis placed, arterial line placed. Patient transferred to ICU for variceal bleed. Patient was started empirically on zosyn, PPI IV BID and on octeotride for 3 days total. Patient was spiking a low grade fever; sputum neg. Pt was started on zosyn empirically. H/H has since been stable. Patient was advanced to clear diet, and then advanced to soft diet. Pt was noted to have injury in pharynx secondary to intubation and EGD trauma. Pt improved and stable for downgrade from ICU.

## 2024-09-30 NOTE — PROGRESS NOTE ADULT - SUBJECTIVE AND OBJECTIVE BOX
PGY-1 Progress Note discussed with attending    PAGER #: [[phone]] TILL 5:00 PM  PLEASE CONTACT ON CALL TEAM:  - On Call Team (Please refer to Carlos) FROM 5:00 PM - 8:30PM  - Nightfloat Team FROM 8:30 -7:30 AM      INTERVAL HPI/OVERNIGHT EVENTS: No acute overnight events. Patient examined at bedside.      ---------------------------    REVIEW OF SYSTEMS:     MEDICATIONS  (STANDING):  chlorhexidine 2% Cloths 1 Application(s) Topical <User Schedule>  folic acid 1 milliGRAM(s) Oral daily  influenza   Vaccine 0.5 milliLiter(s) IntraMuscular once  pantoprazole  Injectable 40 milliGRAM(s) IV Push two times a day  piperacillin/tazobactam IVPB.. 3.375 Gram(s) IV Intermittent every 8 hours    MEDICATIONS  (PRN):  ondansetron Injectable 4 milliGRAM(s) IV Push every 8 hours PRN Nausea and/or Vomiting      Vital Signs Last 24 Hrs  T(C): 36.7 (29 Sep 2024 05:38), Max: 36.8 (28 Sep 2024 12:00)  T(F): 98.1 (29 Sep 2024 05:38), Max: 98.3 (28 Sep 2024 12:00)  HR: 69 (29 Sep 2024 05:38) (65 - 75)  BP: 110/70 (29 Sep 2024 05:38) (103/74 - 116/71)  BP(mean): 85 (28 Sep 2024 13:00) (82 - 86)  RR: 16 (29 Sep 2024 05:38) (11 - 20)  SpO2: 96% (29 Sep 2024 05:38) (95% - 100%)    Parameters below as of 29 Sep 2024 05:38  Patient On (Oxygen Delivery Method): nasal cannula        -----------------------------    PHYSICAL EXAMINATION:  GENERAL: NAD, well built  HEAD:  Atraumatic, Normocephalic  CHEST/LUNG: Clear to auscultation bilaterally; No rales, rhonchi, wheezing, or rubs  HEART: Regular rate and rhythm; No murmurs, rubs, or gallops  ABDOMEN: Soft, Nontender, Nondistended; Bowel sounds present, no pain or masses on palpation  NERVOUS SYSTEM: Alert & Oriented X3  EXTREMITIES: No edema  SKIN: warm dry                          8.6    3.01  )-----------( 54       ( 29 Sep 2024 05:51 )             25.9     09-29    142  |  111[H]  |  12  ----------------------------<  97  3.4[L]   |  23  |  0.94    Ca    8.0[L]      29 Sep 2024 05:51  Phos  3.2     09-29  Mg     1.8     09-29    TPro  6.4  /  Alb  3.2[L]  /  TBili  1.1  /  DBili  x   /  AST  21  /  ALT  32  /  AlkPhos  104  09-29    LIVER FUNCTIONS - ( 29 Sep 2024 05:51 )  Alb: 3.2 g/dL / Pro: 6.4 g/dL / ALK PHOS: 104 U/L / ALT: 32 U/L DA / AST: 21 U/L / GGT: x                   I&O's Summary    28 Sep 2024 07:01  -  29 Sep 2024 07:00  --------------------------------------------------------  IN: 500 mL / OUT: 550 mL / NET: -50 mL          Culture - Blood (collected 26 Sep 2024 10:00)  Source: .Blood BLOOD  Preliminary Report (28 Sep 2024 13:01):    No growth at 48 Hours    Culture - Blood (collected 26 Sep 2024 09:45)  Source: .Blood BLOOD  Preliminary Report (28 Sep 2024 13:01):    No growth at 48 Hours    Culture - Sputum (collected 26 Sep 2024 09:40)  Source: ET Tube ET Tube  Gram Stain (26 Sep 2024 21:44):    Numerous polymorphonuclear leukocytes per low power field    No Squamous epithelial cells per low power field    No organisms seen per oil power field  Final Report (27 Sep 2024 22:46):    Normal Respiratory Nano present        CAPILLARY BLOOD GLUCOSE      RADIOLOGY & ADDITIONAL TESTS:

## 2024-10-01 VITALS
DIASTOLIC BLOOD PRESSURE: 62 MMHG | OXYGEN SATURATION: 98 % | HEART RATE: 72 BPM | SYSTOLIC BLOOD PRESSURE: 102 MMHG | TEMPERATURE: 98 F | RESPIRATION RATE: 18 BRPM

## 2024-10-01 LAB
A1AT SERPL-MCNC: 202 MG/DL — HIGH (ref 90–200)
ALBUMIN SERPL ELPH-MCNC: 3.5 G/DL — SIGNIFICANT CHANGE UP (ref 3.5–5)
ALP SERPL-CCNC: 163 U/L — HIGH (ref 40–120)
ALT FLD-CCNC: 38 U/L DA — SIGNIFICANT CHANGE UP (ref 10–60)
ANION GAP SERPL CALC-SCNC: 9 MMOL/L — SIGNIFICANT CHANGE UP (ref 5–17)
AST SERPL-CCNC: 20 U/L — SIGNIFICANT CHANGE UP (ref 10–40)
BILIRUB SERPL-MCNC: 0.7 MG/DL — SIGNIFICANT CHANGE UP (ref 0.2–1.2)
BUN SERPL-MCNC: 14 MG/DL — SIGNIFICANT CHANGE UP (ref 7–18)
CALCIUM SERPL-MCNC: 8.6 MG/DL — SIGNIFICANT CHANGE UP (ref 8.4–10.5)
CERULOPLASMIN SERPL-MCNC: 31 MG/DL — HIGH (ref 15–30)
CHLORIDE SERPL-SCNC: 109 MMOL/L — HIGH (ref 96–108)
CO2 SERPL-SCNC: 24 MMOL/L — SIGNIFICANT CHANGE UP (ref 22–31)
CREAT SERPL-MCNC: 0.91 MG/DL — SIGNIFICANT CHANGE UP (ref 0.5–1.3)
CULTURE RESULTS: SIGNIFICANT CHANGE UP
CULTURE RESULTS: SIGNIFICANT CHANGE UP
EGFR: 105 ML/MIN/1.73M2 — SIGNIFICANT CHANGE UP
GLUCOSE SERPL-MCNC: 87 MG/DL — SIGNIFICANT CHANGE UP (ref 70–99)
HAV IGM SER-ACNC: SIGNIFICANT CHANGE UP
HBV CORE IGM SER-ACNC: SIGNIFICANT CHANGE UP
HBV SURFACE AG SER-ACNC: SIGNIFICANT CHANGE UP
HCT VFR BLD CALC: 27.6 % — LOW (ref 39–50)
HCV AB S/CO SERPL IA: 0.12 S/CO — SIGNIFICANT CHANGE UP (ref 0–0.99)
HCV AB SERPL-IMP: SIGNIFICANT CHANGE UP
HGB BLD-MCNC: 9.1 G/DL — LOW (ref 13–17)
MAGNESIUM SERPL-MCNC: 2.1 MG/DL — SIGNIFICANT CHANGE UP (ref 1.6–2.6)
MCHC RBC-ENTMCNC: 26.7 PG — LOW (ref 27–34)
MCHC RBC-ENTMCNC: 33 GM/DL — SIGNIFICANT CHANGE UP (ref 32–36)
MCV RBC AUTO: 80.9 FL — SIGNIFICANT CHANGE UP (ref 80–100)
NRBC # BLD: 0 /100 WBCS — SIGNIFICANT CHANGE UP (ref 0–0)
PHOSPHATE SERPL-MCNC: 3.5 MG/DL — SIGNIFICANT CHANGE UP (ref 2.5–4.5)
PLATELET # BLD AUTO: 53 K/UL — LOW (ref 150–400)
POTASSIUM SERPL-MCNC: 3.8 MMOL/L — SIGNIFICANT CHANGE UP (ref 3.5–5.3)
POTASSIUM SERPL-SCNC: 3.8 MMOL/L — SIGNIFICANT CHANGE UP (ref 3.5–5.3)
PROT SERPL-MCNC: 6.9 G/DL — SIGNIFICANT CHANGE UP (ref 6–8.3)
RBC # BLD: 3.41 M/UL — LOW (ref 4.2–5.8)
RBC # FLD: 16.8 % — HIGH (ref 10.3–14.5)
SODIUM SERPL-SCNC: 142 MMOL/L — SIGNIFICANT CHANGE UP (ref 135–145)
SPECIMEN SOURCE: SIGNIFICANT CHANGE UP
SPECIMEN SOURCE: SIGNIFICANT CHANGE UP
WBC # BLD: 2.73 K/UL — LOW (ref 3.8–10.5)
WBC # FLD AUTO: 2.73 K/UL — LOW (ref 3.8–10.5)

## 2024-10-01 PROCEDURE — 87205 SMEAR GRAM STAIN: CPT

## 2024-10-01 PROCEDURE — 87640 STAPH A DNA AMP PROBE: CPT

## 2024-10-01 PROCEDURE — 80053 COMPREHEN METABOLIC PANEL: CPT

## 2024-10-01 PROCEDURE — 80074 ACUTE HEPATITIS PANEL: CPT

## 2024-10-01 PROCEDURE — 83605 ASSAY OF LACTIC ACID: CPT

## 2024-10-01 PROCEDURE — 94002 VENT MGMT INPAT INIT DAY: CPT

## 2024-10-01 PROCEDURE — 94003 VENT MGMT INPAT SUBQ DAY: CPT

## 2024-10-01 PROCEDURE — P9100: CPT

## 2024-10-01 PROCEDURE — 96375 TX/PRO/DX INJ NEW DRUG ADDON: CPT

## 2024-10-01 PROCEDURE — 82330 ASSAY OF CALCIUM: CPT

## 2024-10-01 PROCEDURE — 83690 ASSAY OF LIPASE: CPT

## 2024-10-01 PROCEDURE — P9040: CPT

## 2024-10-01 PROCEDURE — 82103 ALPHA-1-ANTITRYPSIN TOTAL: CPT

## 2024-10-01 PROCEDURE — 71045 X-RAY EXAM CHEST 1 VIEW: CPT

## 2024-10-01 PROCEDURE — 86923 COMPATIBILITY TEST ELECTRIC: CPT

## 2024-10-01 PROCEDURE — 87641 MR-STAPH DNA AMP PROBE: CPT

## 2024-10-01 PROCEDURE — 96374 THER/PROPH/DIAG INJ IV PUSH: CPT

## 2024-10-01 PROCEDURE — P9059: CPT

## 2024-10-01 PROCEDURE — 97162 PT EVAL MOD COMPLEX 30 MIN: CPT

## 2024-10-01 PROCEDURE — 86038 ANTINUCLEAR ANTIBODIES: CPT

## 2024-10-01 PROCEDURE — 82390 ASSAY OF CERULOPLASMIN: CPT

## 2024-10-01 PROCEDURE — 71275 CT ANGIOGRAPHY CHEST: CPT | Mod: MC

## 2024-10-01 PROCEDURE — 36430 TRANSFUSION BLD/BLD COMPNT: CPT

## 2024-10-01 PROCEDURE — 84484 ASSAY OF TROPONIN QUANT: CPT

## 2024-10-01 PROCEDURE — 36415 COLL VENOUS BLD VENIPUNCTURE: CPT

## 2024-10-01 PROCEDURE — 84132 ASSAY OF SERUM POTASSIUM: CPT

## 2024-10-01 PROCEDURE — 99285 EMERGENCY DEPT VISIT HI MDM: CPT

## 2024-10-01 PROCEDURE — 84100 ASSAY OF PHOSPHORUS: CPT

## 2024-10-01 PROCEDURE — 82803 BLOOD GASES ANY COMBINATION: CPT

## 2024-10-01 PROCEDURE — 74174 CTA ABD&PLVS W/CONTRAST: CPT | Mod: MC

## 2024-10-01 PROCEDURE — P9037: CPT

## 2024-10-01 PROCEDURE — 86900 BLOOD TYPING SEROLOGIC ABO: CPT

## 2024-10-01 PROCEDURE — 85610 PROTHROMBIN TIME: CPT

## 2024-10-01 PROCEDURE — 87070 CULTURE OTHR SPECIMN AEROBIC: CPT

## 2024-10-01 PROCEDURE — 84295 ASSAY OF SERUM SODIUM: CPT

## 2024-10-01 PROCEDURE — 85027 COMPLETE CBC AUTOMATED: CPT

## 2024-10-01 PROCEDURE — 82962 GLUCOSE BLOOD TEST: CPT

## 2024-10-01 PROCEDURE — 86850 RBC ANTIBODY SCREEN: CPT

## 2024-10-01 PROCEDURE — 83735 ASSAY OF MAGNESIUM: CPT

## 2024-10-01 PROCEDURE — 93005 ELECTROCARDIOGRAM TRACING: CPT

## 2024-10-01 PROCEDURE — 85730 THROMBOPLASTIN TIME PARTIAL: CPT

## 2024-10-01 PROCEDURE — 85025 COMPLETE CBC W/AUTO DIFF WBC: CPT

## 2024-10-01 PROCEDURE — 86901 BLOOD TYPING SEROLOGIC RH(D): CPT

## 2024-10-01 PROCEDURE — 87040 BLOOD CULTURE FOR BACTERIA: CPT

## 2024-10-01 RX ORDER — PANTOPRAZOLE SODIUM 40 MG/1
1 TABLET, DELAYED RELEASE ORAL
Qty: 30 | Refills: 0
Start: 2024-10-01 | End: 2024-10-30

## 2024-10-01 RX ORDER — FOLIC ACID 1 MG/1
1 TABLET ORAL
Qty: 30 | Refills: 0
Start: 2024-10-01 | End: 2024-10-30

## 2024-10-01 RX ADMIN — PANTOPRAZOLE SODIUM 40 MILLIGRAM(S): 40 TABLET, DELAYED RELEASE ORAL at 06:02

## 2024-10-01 RX ADMIN — FOLIC ACID 1 MILLIGRAM(S): 1 TABLET ORAL at 12:32

## 2024-10-01 RX ADMIN — CHLORHEXIDINE GLUCONATE ORAL RINSE 1 APPLICATION(S): 1.2 SOLUTION DENTAL at 06:02

## 2024-10-01 NOTE — DISCHARGE NOTE NURSING/CASE MANAGEMENT/SOCIAL WORK - PATIENT PORTAL LINK FT
You can access the FollowMyHealth Patient Portal offered by [facility] by registering at the following website: [email or web address]. By joining [employer]’s FollowMyHealth portal, you will also be able to view your health information using other applications (apps) compatible with our system.

## 2024-10-01 NOTE — CHART NOTE - NSCHARTNOTEFT_GEN_A_CORE
Attempted to see patient earlier today, but patient has already been discharged. As per discussion w/ primary team, patient received instructions about GI/Hepatology appt. outpatient at [place] or [place] due to insurance issues.

## 2024-10-01 NOTE — PROGRESS NOTE ADULT - SUBJECTIVE AND OBJECTIVE BOX
Chief Complaint:  Patient is a 48y old  Male who presents with a chief complaint of GI bleed (30 Sep 2024 19:30)      Reason for consult:    Interval Events:     Hospital Medications:  chlorhexidine 2% Cloths 1 Application(s) Topical <User Schedule>  folic acid 1 milliGRAM(s) Oral daily  influenza   Vaccine 0.5 milliLiter(s) IntraMuscular once  ondansetron Injectable 4 milliGRAM(s) IV Push every 8 hours PRN  pantoprazole  Injectable 40 milliGRAM(s) IV Push two times a day      ROS:   General:  No  fevers, chills, night sweats, fatigue  Eyes:  Good vision, no reported pain  ENT:  No sore throat, pain, runny nose  CV:  No pain, palpitations  Pulm:  No dyspnea, cough  GI:  See HPI, otherwise negative  :  No  incontinence, nocturia  Muscle:  No pain, weakness  Neuro:  No memory problems  Psych:  No insomnia, mood problems, depression  Endocrine:  No polyuria, polydipsia, cold/heat intolerance  Heme:  No petechiae, ecchymosis, easy bruisability  Skin:  No rash    PHYSICAL EXAM:   Vital Signs:  Vital Signs Last 24 Hrs  T(C): 36.6 (01 Oct 2024 05:34), Max: 37 (30 Sep 2024 20:30)  T(F): 97.8 (01 Oct 2024 05:34), Max: 98.6 (30 Sep 2024 20:30)  HR: 74 (01 Oct 2024 05:34) (69 - 74)  BP: 106/69 (01 Oct 2024 05:34) (99/62 - 108/62)  BP(mean): --  RR: 17 (01 Oct 2024 05:34) (17 - 18)  SpO2: 97% (01 Oct 2024 05:34) (97% - 99%)    Parameters below as of 01 Oct 2024 05:34  Patient On (Oxygen Delivery Method): room air      Daily     Daily     GENERAL: no acute distress  NEURO: alert, no asterixis  HEENT: anicteric sclera, no conjunctival pallor appreciated  CHEST: no respiratory distress, no accessory muscle use  CARDIAC: regular rate, rhythm  ABDOMEN: soft, non-tender, non-distended, no rebound or guarding  EXTREMITIES: warm, well perfused, no edema  SKIN: no lesions noted    LABS: reviewed                        9.1    2.73  )-----------( 53       ( 01 Oct 2024 06:53 )             27.6     10-01    142  |  109[H]  |  14  ----------------------------<  87  3.8   |  24  |  0.91    Ca    8.6      01 Oct 2024 06:53  Phos  3.5     10-01  Mg     2.1     10-01    TPro  6.9  /  Alb  3.5  /  TBili  0.7  /  DBili  x   /  AST  20  /  ALT  38  /  AlkPhos  163[H]  10-01    LIVER FUNCTIONS - ( 01 Oct 2024 06:53 )  Alb: 3.5 g/dL / Pro: 6.9 g/dL / ALK PHOS: 163 U/L / ALT: 38 U/L DA / AST: 20 U/L / GGT: x             Interval Diagnostic Studies: see sunrise for full report

## 2024-10-01 NOTE — PROGRESS NOTE ADULT - ASSESSMENT
48y Male w/ Hx of former AUD (reportedly quit 7 years ago), cirrhosis, presented to [facility] ER on 9/25/24 w/ UGIB (hematemesis x5) in setting of cirrhosis and taking ibuprofen for knee pain every other day for a week, and also reported melena ~ 2 weeks prior to admission, was found to be hypotensive, anemic (Hb 6.5 on admission), CT /p showed wall thickening along the inferior esophagus raising possibility of esophageal tear, as well as marked splenomegaly (22 cm), and became unstable during EGD, requiring initiation of MTP and transfer to ICU. EGD  9/25/24 showed bright red blood in esophagus with active arterial (?) bleed, varix w/ nipple sign squirting and got banded (x4).   Hepatology was consulted 9/27 b/o cirrhosis.     UGIB in setting of cirrhosis and NSAID use  - C/w mgmt per GI  - Agree w/ iv PPI bid, to c/w octreotide and SBP ppx  - Monitor H/H, keep Hb >7, and keep PLT > 50 and fibrinogen > 120 if bleeding  - If rebleeds, consider transfer to [facility]    Cirrhosis, MELD 3.0 9  - Patient does not meet criteria for alcoholic hepatitis (no jaundice, no recent alcohol use)  - Please, send Peth to confirm abstinence  - No PSE, monitor mental status  - No ascites, Cr wNL, monitor renal function  - Transplant candidacy: need further information, MELD is low, but variceal bleed is decompensation  - F/u final infectious workup  - Send CLD workup, including hepatitis serologies, autoimmune workup, A1AT, ceruloplasmin    Thrombocytopenia  Marked splenomegaly  - While patient has cirrhosis and liver slightly nodular, splenomegaly is very significant, consider hematology consult to evaluate for any secondary causes in addition to cirrhosis      Thank you for consult  Hepatology will follow  INCOMPLETE NOTE< FULL NOTE TO FOLLOW

## 2024-10-01 NOTE — PROGRESS NOTE ADULT - PROVIDER SPECIALTY LIST ADULT
Critical Care
Critical Care
Hepatology
Infectious Disease
Internal Medicine
Critical Care
Gastroenterology
Hepatology
Gastroenterology

## 2024-10-01 NOTE — PROGRESS NOTE ADULT - SUBJECTIVE AND OBJECTIVE BOX
INTERVAL HPI/OVERNIGHT EVENTS:  Patient seen,stable clinically  VITAL SIGNS:  T(F): 97.8 (10-01-24 @ 05:34)  HR: 74 (10-01-24 @ 05:34)  BP: 106/69 (10-01-24 @ 05:34)  RR: 17 (10-01-24 @ 05:34)  SpO2: 97% (10-01-24 @ 05:34)  Wt(kg): --    PHYSICAL EXAM:  awake  Constitutional:  Eyes:  ENMT:perrla  Neck:  Respiratory:clear  Cardiovascular:s1s2,m-none  Gastrointestinal:soft,bs pos  Extremities:  Vascular:  Neurological:no focal deficit  Musculoskeletal:    MEDICATIONS  (STANDING):  chlorhexidine 2% Cloths 1 Application(s) Topical <User Schedule>  folic acid 1 milliGRAM(s) Oral daily  influenza   Vaccine 0.5 milliLiter(s) IntraMuscular once  pantoprazole  Injectable 40 milliGRAM(s) IV Push two times a day    MEDICATIONS  (PRN):  ondansetron Injectable 4 milliGRAM(s) IV Push every 8 hours PRN Nausea and/or Vomiting      Allergies    No Known Allergies    Intolerances        LABS:                        9.1    2.73  )-----------( 53       ( 01 Oct 2024 06:53 )             27.6     10-01    142  |  109[H]  |  14  ----------------------------<  87  3.8   |  24  |  0.91    Ca    8.6      01 Oct 2024 06:53  Phos  3.5     10-01  Mg     2.1     10-01    TPro  6.9  /  Alb  3.5  /  TBili  0.7  /  DBili  x   /  AST  20  /  ALT  38  /  AlkPhos  163[H]  10-01      Urinalysis Basic - ( 01 Oct 2024 06:53 )    Color: x / Appearance: x / SG: x / pH: x  Gluc: 87 mg/dL / Ketone: x  / Bili: x / Urobili: x   Blood: x / Protein: x / Nitrite: x   Leuk Esterase: x / RBC: x / WBC x   Sq Epi: x / Non Sq Epi: x / Bacteria: x        RADIOLOGY & ADDITIONAL TESTS:      Assessment and Plan:   · Assessment	  48M pmhx of cirrhosis and right knee pain presenting with hematemesis. Patient received 1PRBC and 1U PLT in the ED. CT angio A/P: wall thickening along inferior esophagus, underlying esophageal tear cannot be excluded, marked splenomegaly, stomach distended with fluid and blood products. Patient was in endoscopy suite getting EGD and RRT called for perfuse hematemesis. Patient was intubated. EGD performed, banded x4. MTP called received 2PRBC and 1FFP Cordis placed, arterial line placed. Patient transferred to ICU for variceal bleed. Patient was started empirically on zosyn, PPI IV BID and on octeotride for 3 days total. Patient was spiking a low grade fever; sputum neg. Pt was started on zosyn empirically. H/H has since been stable. Patient was advanced to clear diet, and then advanced to soft diet. Pt was noted to have injury in pharynx secondary to intubation and EGD trauma. Pt improved and stable for downgrade from ICU.        Problem/Plan - 1:  ·  Problem: GI bleed-stable clinically  s/p MTP: 2PRBC and 1FFP Cordis placed, arterial line placed.   Patient transferred to ICU for variceal bleed.   s/p zosyn, PPI IV BID and on octeotride for 3 days total.  -c/w Zosyn, IV PPI, thiamine, folic acid  regular diet  -f/u hep, heme onc for splenomegaly workup  -Trend CBC q24h.     Problem/Plan - 2:  ·  Problem: Cirrhosis.   ·  Plan: 2/2 EtOH  MELD score: 9  s/p EGD w/banding x 4 on 9/25  s/p octreotide drip for 72h (9/25-9/27)  Hepatology Dr. Mayo following    Will f/u hepatology recs.     Problem/Plan - 3:  ·  Problem: Sepsis.   ·  Plan: Concern for sepsis from SBP vs aspiration PNA vs ?infection from cordis with hx of cirrhosis, profuse bleeding from varices during EGD    9/26: 100.2 AM temp, WBC 3.54, Cordis removed on 9/26 9/27: Afebrile, right radial arterial line removed    Sputum Clx negative  Blood Clx negative  ID Dr. King Following    -c/w Zosyn 3.375 mg q8h  -f/u ID to d/c abx.     Problem/Plan - 4:  ·  Problem: Pancytopenia.   ·  Plan: 2/2 cirrhosis  s/p 4 PRBC, 3 PLT, 2 FFP  H/H stable    -Will maintain active T&S  -Will transfuse if Hb < 7  -Will trend CBC q24h  -f/u hepatology, heme/onc for recs.     Problem/Plan - 5:  ·  Problem: Electrolyte abnormality.   ·  Plan: #Hypernatremia on admission likely 2/2 to poor oral intake-RESOLVED  #Hypokalemia    Replete PRN.     Problem/Plan - 6:  ·  Problem: Prophylactic measure.   ·  Plan: DVT ppx: SCDs.

## 2024-10-01 NOTE — CHART NOTE - NSCHARTNOTESELECT_GEN_ALL_CORE
Daily Update/Event Note
downgrade/Event Note
removal cordis/Event Note
Off Service Note
removal a-line/Event Note

## 2024-10-01 NOTE — PROGRESS NOTE ADULT - REASON FOR ADMISSION
GI bleed

## 2024-10-02 LAB — ANA TITR SER: NEGATIVE — SIGNIFICANT CHANGE UP
